# Patient Record
Sex: FEMALE | Employment: OTHER | URBAN - METROPOLITAN AREA
[De-identification: names, ages, dates, MRNs, and addresses within clinical notes are randomized per-mention and may not be internally consistent; named-entity substitution may affect disease eponyms.]

---

## 2022-06-03 PROBLEM — R01.1 CARDIAC MURMUR: Status: ACTIVE | Noted: 2022-06-03

## 2022-06-03 PROBLEM — I48.0 PAROXYSMAL ATRIAL FIBRILLATION (HCC): Status: ACTIVE | Noted: 2022-06-03

## 2022-06-03 PROBLEM — J44.9 CHRONIC OBSTRUCTIVE PULMONARY DISEASE (HCC): Status: ACTIVE | Noted: 2022-06-03

## 2022-06-03 PROBLEM — I27.20 PULMONARY HYPERTENSION (HCC): Status: ACTIVE | Noted: 2022-06-03

## 2022-06-03 PROBLEM — E78.5 DYSLIPIDEMIA: Status: ACTIVE | Noted: 2022-06-03

## 2022-06-08 RX ORDER — FUROSEMIDE 20 MG/1
20 TABLET ORAL DAILY
COMMUNITY

## 2022-06-08 RX ORDER — LEVOCETIRIZINE DIHYDROCHLORIDE 5 MG/1
5 TABLET, FILM COATED ORAL EVERY EVENING
COMMUNITY

## 2022-06-10 ENCOUNTER — OFFICE VISIT (OUTPATIENT)
Dept: CARDIOLOGY CLINIC | Facility: CLINIC | Age: 87
End: 2022-06-10
Payer: MEDICARE

## 2022-06-10 VITALS
DIASTOLIC BLOOD PRESSURE: 70 MMHG | SYSTOLIC BLOOD PRESSURE: 100 MMHG | HEART RATE: 65 BPM | WEIGHT: 121 LBS | BODY MASS INDEX: 23.75 KG/M2 | HEIGHT: 60 IN | OXYGEN SATURATION: 98 % | TEMPERATURE: 97 F

## 2022-06-10 DIAGNOSIS — I11.9 HYPERTENSIVE HEART DISEASE WITHOUT HEART FAILURE: ICD-10-CM

## 2022-06-10 DIAGNOSIS — I27.20 PULMONARY HYPERTENSION (HCC): ICD-10-CM

## 2022-06-10 DIAGNOSIS — J44.9 CHRONIC OBSTRUCTIVE PULMONARY DISEASE, UNSPECIFIED COPD TYPE (HCC): ICD-10-CM

## 2022-06-10 DIAGNOSIS — E78.5 DYSLIPIDEMIA: ICD-10-CM

## 2022-06-10 DIAGNOSIS — I48.0 PAROXYSMAL ATRIAL FIBRILLATION (HCC): ICD-10-CM

## 2022-06-10 DIAGNOSIS — R01.1 CARDIAC MURMUR: ICD-10-CM

## 2022-06-10 PROCEDURE — 99214 OFFICE O/P EST MOD 30 MIN: CPT | Performed by: INTERNAL MEDICINE

## 2022-06-10 RX ORDER — CHLORAL HYDRATE 500 MG
1 CAPSULE ORAL 2 TIMES DAILY
COMMUNITY

## 2022-06-10 RX ORDER — NICOTINE POLACRILEX 2 MG
GUM BUCCAL DAILY
COMMUNITY

## 2022-06-10 NOTE — PROGRESS NOTES
Consultation - Cardiology Office  [facility]  Shelley Spencer 82 y o  female MRN: [id number]  : 1935  Unit/Bed#:  Encounter: [id number]      Assessment:     1  Paroxysmal atrial fibrillation (HCC)    2  Hypertensive heart disease without heart failure    3  Dyslipidemia    4  Cardiac murmur    5  Pulmonary hypertension ([facility] )    6  Chronic obstructive pulmonary disease, unspecified COPD type ([facility] [address] )        Discussion summary and Plan:       1  Paroxysmal atrial fibrillation  She is maintaining sinus rhythm  As per previous cardiac there is 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  Not on any AV node blocking drug heart rate is around 65 beats per minute  She is not but symptomatic  Antithrombotic therapies with Eliquis     2  Hypertensive heart disease without heart failure  She has takes diuretics blood pressure has been acceptable  3  Dyslipidemia  She has been on fenofibrate  She is not tolerating very well  Cardiology note reviewed  She could not tolerate other statins  She is doing well on it  And considering her age I will continue same Rx       4  Cardiac murmur  She does have history of cardiac murmur echo Doppler in May 2021 shows mild-to-moderate MR mild TR EF is acceptable  5  History of COPD  Currently stable     6  Mild pulmonary hypertension     7  DJD walks with the help of cane     continue current Rx  Note reviewed  No changes made  Follow-up in 6 months and then will consider blood test       Patient / Fitz Rivas was advised and educated to call our office  immediately if  patient has any new symptoms of chest pain/shortness of breath, near-syncope, syncope, light headedness sustained palpitations  or any other cardiovascular symptoms before their scheduled follow-up appointment  Office #[phone]  Thank you for your consultation    If you have any question please call me at [phone]    Counseling :  A description of the counseling  Goals and Barriers  Patient's ability to self care: Yes  Medication side effect reviewed with patient in detail and all their questions answered to their satisfaction  Primary Care Physician Requesting Consult: No primary care provider on file  Reason for Consult / Principal Problem:   Paroxysmal atrial fibrillation and to establish with Cardiology practice  HPI :     Bam Gaspar is a 82y o  year old female who was referred by primary care doctor for  Atrial fibrillation and to establish with practice  Patient who used to live in [place] moved here as her  has passed away  She used to follow up with cardiologist who has note from February 22nd I reviewed  She has medical history significant as per note intermittent /paroxysmal atrial fibrillation, elevated Cyn Vasc score, mild pulmonary hypertension, bronchiectasis, DJD and dyslipidemia  She has an echo Doppler done in May 2021 which shows her EF is 55% mild left atrial enlargement with size of 4 4 cm mild-to-moderate MR and mild TR with PA pressure 35 mmHg  She has been doing well her med list was reviewed she is taking Eliquis 2 5 twice a day Lasix 20 mg daily fenofibrate and Xyzal       She moved here as her son works air and live in this area  She lives alone she is pretty independent in her daily activities  She has no new complaints of chest pain shortness of breath dizziness lightheadedness or palpitations  She walks with the help of cane  She had history of hysterectomy as well as shoulder surgery otherwise she is doing well  History of cholecystectomy      Review of Systems   Constitutional: Negative for activity change, chills, diaphoresis, fever and unexpected weight change  HENT: Negative for congestion  Eyes: Negative for discharge and redness  Respiratory: Negative for cough, chest tightness, shortness of breath and wheezing  Cardiovascular: Negative    Negative for chest pain, palpitations and leg swelling  Gastrointestinal: Negative for abdominal pain, diarrhea and nausea  Endocrine: Negative  Genitourinary: Negative for decreased urine volume and urgency  Musculoskeletal: Positive for arthralgias and gait problem  Negative for back pain  Skin: Negative for rash and wound  Allergic/Immunologic: Negative  Neurological: Negative for dizziness, seizures, syncope, weakness, light-headedness and headaches  Hematological: Negative  Psychiatric/Behavioral: Negative for agitation and confusion  The patient is not nervous/anxious  Historical Information   Past Medical History:   Diagnosis Date    A-fib [facility])     from prior records    COPD [facility]chronic obstructive pulmonary disease) [facility] [address] )     Hyperlipidemia     MR (mitral regurgitation)     Pul[facility] )      History reviewed  No pertinent surgical history    Social History     Substance and Sexual Activity   Alcohol Use Never     Social History     Substance and Sexual Activity   Drug Use Not on file     Social History     Tobacco Use   Smoking Status Never Smoker   Smokeless Tobacco Never Used     Family History:   Family History   Problem Relation Age of Onset    Stroke Sister        Meds/Allergies     Allergies   Allergen Reactions    Nitrofurantoin Other (See Comments)    Tramadol Other (See Comments)       Current Outpatient Medications:     apixaban (ELIQUIS) 2 5 mg, Take 2 5 mg by mouth 2 (two) times a day, Disp: , Rfl:     Biotin 1 MG CAPS, Use daily, Disp: , Rfl:     Calcium Carbonate Antacid (CALCIUM CARBONATE PO), Take by mouth in the morning, Disp: , Rfl:     Cholecalciferol (Vitamin D3) 1 25 MG (68190 UT) TABS, Take by mouth, Disp: , Rfl:     FENOFIBRATE PO, Take 160 mg by mouth in the morning, Disp: , Rfl:     furosemide (LASIX) 20 mg tablet, Take 20 mg by mouth in the morning, Disp: , Rfl:     GNP CRANBERRY EXTRACT PO, Take 600 mg by mouth, Disp: , Rfl:     levocetirizine (XYZAL) 5 MG tablet, Take 5 mg by mouth every evening, Disp: , Rfl:     Multiple Vitamin (MULTIVITAMIN ADULT PO), Take 1 tablet by mouth daily, Disp: , Rfl:     Omega-3 Fatty Acids (fish oil) 1,000 mg, Take 1 g by mouth 2 (two) times a day, Disp: , Rfl:     TIMOLOL MALEATE OP, Apply to eye daily, Disp: , Rfl:     Vitals: Blood pressure 100/70, pulse 65, temperature (!) 97 °F (36 1 °C), height 5' (1 524 m), weight 54 9 kg (121 lb), SpO2 98 %  ?  Body mass index is 23 63 kg/m²  Wt Readings from Last 3 Encounters:   06/10/22 54 9 kg (121 lb)     Vitals:    06/10/22 1356   Weight: 54 9 kg (121 lb)     BP Readings from Last 3 Encounters:   06/10/22 100/70         Physical Exam    Neurologic:  Alert & oriented x 3, no new focal deficits, Not in any acute distress,  Constitutional:  Adequate built, non-toxic appearance   Neck: Normal range of motion, no tenderness,  Neck supple   Respiratory:  Bilateral air entry, mostly clear to auscultation  Cardiovascular: S1-S2 regular with a 2/6 ejection systolic murmur and S4 is present  GI:  Soft, nondistended,  nontender, no hepatosplenomegaly appreciated  Musculoskeletal:  No edema, no tenderness, no deformities  Skin:  Well hydrated, no rash   Extremities:  No edema and distal pulses are present  Psychiatric:  Speech and behavior appropriate     Diagnostic Studies Review Cardio:      Echo Doppler done in May 2021 in  [place] shows EF 55%, mild-to-moderate MR, mild TR with PA pressure 34 left atrial size was 4 4 cm  EKG:  Twelve lead EKG 06/10/2022 shows normal sinus rhythm heart rate 65 beats per minute no other abnormality  Dr Maureen Pace MD [facility]      "This note has been constructed using a voice recognition system  Therefore there may be syntax, spelling, and/or grammatical errors   Please call if you have any questions  "

## 2022-06-28 DIAGNOSIS — I48.0 PAROXYSMAL ATRIAL FIBRILLATION (HCC): Primary | ICD-10-CM

## 2022-10-05 ENCOUNTER — TELEPHONE (OUTPATIENT)
Dept: CARDIOLOGY CLINIC | Facility: CLINIC | Age: 87
End: 2022-10-05

## 2022-10-05 NOTE — TELEPHONE ENCOUNTER
If she is doing well she can continue to monitor at home however she has recurrent episodes of chest in particularly chest pain accompanied with shortness of breath or chest heaviness and he need to call us or go to the hospital

## 2022-12-16 ENCOUNTER — OFFICE VISIT (OUTPATIENT)
Dept: CARDIOLOGY CLINIC | Facility: CLINIC | Age: 87
End: 2022-12-16

## 2022-12-16 VITALS
HEIGHT: 60 IN | OXYGEN SATURATION: 99 % | DIASTOLIC BLOOD PRESSURE: 80 MMHG | TEMPERATURE: 97 F | HEART RATE: 75 BPM | SYSTOLIC BLOOD PRESSURE: 120 MMHG | WEIGHT: 120 LBS | BODY MASS INDEX: 23.56 KG/M2

## 2022-12-16 DIAGNOSIS — R01.1 CARDIAC MURMUR: ICD-10-CM

## 2022-12-16 DIAGNOSIS — J44.9 CHRONIC OBSTRUCTIVE PULMONARY DISEASE, UNSPECIFIED COPD TYPE (HCC): ICD-10-CM

## 2022-12-16 DIAGNOSIS — I11.9 HYPERTENSIVE HEART DISEASE WITHOUT HEART FAILURE: ICD-10-CM

## 2022-12-16 DIAGNOSIS — I48.0 PAROXYSMAL ATRIAL FIBRILLATION (HCC): ICD-10-CM

## 2022-12-16 DIAGNOSIS — I27.20 PULMONARY HYPERTENSION (HCC): ICD-10-CM

## 2022-12-16 DIAGNOSIS — E78.5 DYSLIPIDEMIA: ICD-10-CM

## 2022-12-16 RX ORDER — FENOFIBRATE 160 MG/1
TABLET ORAL
COMMUNITY
Start: 2022-11-09

## 2022-12-16 RX ORDER — GLIMEPIRIDE 2 MG/1
TABLET ORAL 2 TIMES DAILY
COMMUNITY

## 2022-12-16 NOTE — PROGRESS NOTES
Progress note- Cardiology Office   [facility]  Dominick Iyer 82 y o  female MRN: [id number]  : 1935  Unit/Bed#:  Encounter: [id number]      Assessment:     1  Hypertensive heart disease without heart failure    2  Paroxysmal atrial fibrillation (HCC)    3  Dyslipidemia    4  Cardiac murmur    5  Pulmonary hypertension ([address] )    6  Chronic obstructive pulmonary disease, unspecified COPD type ([facility] [address] )        Discussion summary and Plan:       1  Paroxysmal atrial fibrillation  She is maintaining sinus rhythm  As per previous cardiac there is 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  Not on any AV node blocking drug heart rate is around 65 beats per minute  She is not but symptomatic  Antithrombotic therapies with Eliquis  Heart rate today 75 bpm   If heart rate goes up we may consider low-dose beta-blocker  2  Hypertensive heart disease without heart failure  She has takes diuretics blood pressure has been acceptable  Same medication     3  Dyslipidemia  She has been on fenofibrate  She is not tolerating very well  Cardiology note reviewed  She could not tolerate other statins  She is doing well on it  And considering her age I will continue same Rx       4  Cardiac murmur  She does have history of cardiac murmur echo Doppler in May 2021 shows mild-to-moderate MR mild TR EF is acceptable  Good Doppler reviewed      5  History of COPD and bronchiectasis  Currently stable patient never smoked her  used to smoke and she worked in [employer]  6  Mild pulmonary hypertension echo Doppler reviewed     7  DJD walks with the help of cane    Continue current Rx  She is suggested to have 1 scheduled blood test she will schedule to follow-up with her primary care doctor        Patient  was advised and educated to call our office  immediately if  patient has any new symptoms of chest pain/shortness of breath, near-syncope, syncope, light headedness sustained palpitations  or any other cardiovascular symptoms before their scheduled follow-up appointment  Office #[phone]  Thank you for your consultation  If you have any question please call me at [phone]    Counseling :  A description of the counseling  Goals and Barriers  Patient's ability to self care: Yes  Medication side effect reviewed with patient in detail and all their questions answered to their satisfaction  Primary Care Physician  GONZALEZ Newton      HPI :     Edna Jansen is a 82y o  year old female who was referred by primary care doctor for  Atrial fibrillation and to establish with practice  Patient who used to live in [place] moved here as her  has passed away  She used to follow up with cardiologist who has note from February 22nd I reviewed  She has medical history significant as per note intermittent /paroxysmal atrial fibrillation, elevated Cyn Vasc score, mild pulmonary hypertension, bronchiectasis, DJD and dyslipidemia  She has an echo Doppler done in May 2021 which shows her EF is 55% mild left atrial enlargement with size of 4 4 cm mild-to-moderate MR and mild TR with PA pressure 35 mmHg  She has been doing well her med list was reviewed she is taking Eliquis 2 5 twice a day Lasix 20 mg daily fenofibrate and Xyzal       She had history of hysterectomy as well as shoulder surgery otherwise she is doing well  History of cholecystectomy    12/16/2022  Patient who has a medical history significant for paroxysmal atrial fibrillation, high [id number] score, mild pulm hypertension, history of bronchiectasis, DJD who came for follow-up  Her previous EF is around 55%  She moved here as her son works here  She walks with the help of a cane but she is pretty independent in her daily activity  Heart positive closed history of hysterectomy  She is otherwise doing well    No nausea no vomiting no fever no chills no PND no orthopnea no other cardiovascular issues  Review of Systems   Constitutional: Negative for activity change, chills, diaphoresis, fever and unexpected weight change  HENT: Negative for congestion  Eyes: Negative for discharge and redness  Respiratory: Negative for cough, chest tightness, shortness of breath and wheezing  Cardiovascular: Negative  Negative for chest pain, palpitations and leg swelling  Gastrointestinal: Negative for abdominal pain, diarrhea and nausea  Endocrine: Negative  Genitourinary: Negative for decreased urine volume and urgency  Musculoskeletal: Positive for arthralgias and gait problem  Negative for back pain  Skin: Negative for rash and wound  Allergic/Immunologic: Negative  Neurological: Negative for dizziness, seizures, syncope, weakness, light-headedness and headaches  Hematological: Negative  Psychiatric/Behavioral: Negative for agitation and confusion  The patient is not nervous/anxious  Historical Information   Past Medical History:   Diagnosis Date   • A-fib ([place] [address] )     from prior records   • COPD (chronic obstructive pulmonary disease) ([place] [address] )    • Hyperlipidemia    • MR (mitral regurgitation)    • Pulmonary hypertension ([place] [address] )      History reviewed  No pertinent surgical history    Social History     Substance and Sexual Activity   Alcohol Use Never     Social History     Substance and Sexual Activity   Drug Use Not on file     Social History     Tobacco Use   Smoking Status Never   Smokeless Tobacco Never     Family History:   Family History   Problem Relation Age of Onset   • Stroke Sister        Meds/Allergies     Allergies   Allergen Reactions   • Nitrofurantoin Other (See Comments)   • Tramadol Other (See Comments)       Current Outpatient Medications:   •  apixaban (ELIQUIS) 2 5 mg, Take 1 tablet (2 5 mg total) by mouth 2 (two) times a day, Disp: 180 tablet, Rfl: 3  •  Biotin 1 MG CAPS, Use daily, Disp: , Rfl:   •  Calcium Carbonate Antacid (CALCIUM CARBONATE PO), Take by mouth in the morning, Disp: , Rfl:   •  Cholecalciferol (Vitamin D3) 1 25 MG (72685 UT) TABS, Take by mouth, Disp: , Rfl:   •  fenofibrate 160 MG tablet, , Disp: , Rfl:   •  furosemide (LASIX) 20 mg tablet, Take 20 mg by mouth in the morning, Disp: , Rfl:   •  GNP CRANBERRY EXTRACT PO, Take 600 mg by mouth, Disp: , Rfl:   •  levocetirizine (XYZAL) 5 MG tablet, Take 5 mg by mouth every evening, Disp: , Rfl:   •  Multiple Vitamin (MULTIVITAMIN ADULT PO), Take 1 tablet by mouth daily, Disp: , Rfl:   •  timolol (TIMOPTIC) 0 25 % ophthalmic solution, 2 (two) times a day, Disp: , Rfl:     Vitals: Blood pressure 120/80, pulse 75, temperature (!) 97 °F (36 1 °C), height 5' (1 524 m), weight 54 4 kg (120 lb), SpO2 99 %  ?  Body mass index is 23 44 kg/m²  Wt Readings from Last 3 Encounters:   12/16/22 54 4 kg (120 lb)   06/10/22 54 9 kg (121 lb)     Vitals:    12/16/22 1255   Weight: 54 4 kg (120 lb)     BP Readings from Last 3 Encounters:   12/16/22 120/80   06/10/22 100/70         Physical Exam  Constitutional:       General: She is not in acute distress  Appearance: She is well-developed  She is not diaphoretic  Neck:      Thyroid: No thyromegaly  Vascular: No JVD  Trachea: No tracheal deviation  Cardiovascular:      Rate and Rhythm: Normal rate and regular rhythm  Heart sounds: S1 normal and S2 normal  Heart sounds not distant  Murmur heard  Systolic (ejection) murmur is present with a grade of 2/6  No friction rub  No gallop  No S3 or S4 sounds  Pulmonary:      Effort: Pulmonary effort is normal  No respiratory distress  Breath sounds: No wheezing or rales  Comments: Bilateral air entry with coarse breath sounds and rare crackles  Chest:      Chest wall: No tenderness  Abdominal:      General: Bowel sounds are normal  There is no distension  Palpations: Abdomen is soft  Tenderness: There is no abdominal tenderness     Musculoskeletal:         General: No deformity  Cervical back: Neck supple  Comments: No edema   Skin:     General: Skin is warm and dry  Coloration: Skin is not pale  Findings: No rash  Neurological:      Mental Status: She is alert and oriented to person, place, and time  Psychiatric:         Behavior: Behavior normal          Judgment: Judgment normal              Diagnostic Studies Review Cardio:      Echo Doppler done in May 2021 in  [place] shows EF 55%, mild-to-moderate MR, mild TR with PA pressure 34 left atrial size was 4 4 cm  EKG:  Twelve lead EKG 06/10/2022 shows normal sinus rhythm heart rate 65 beats per minute no other abnormality  Twelve-lead EKG done on 12/16/2022 showed normal sinus and heart rate 75 bpm   No change from other EKG        Dr Kayleen Rees MD [facility]      "This note has been constructed using a voice recognition system  Therefore there may be syntax, spelling, and/or grammatical errors   Please call if you have any questions  "

## 2023-01-11 NOTE — PROGRESS NOTES
Assessment and Plan:   Ms Dilma Miller is an 29-year-old female with history significant for left foot osteoarthritis who presents to establish with [facility]  She is transferring care from rheumatology in [facility]  She is self-referred today  Byron Falcon presents today for continued management of left foot osteoarthritis for which she is already established with podiatry  She does not offer any additional complaints today  I advised her for a diagnosis of primary osteoarthritis affecting a limited joint region she does not require an ongoing rheumatology follow-up and she may continue care through podiatry  She can continue the Tylenol and over-the-counter pain rubs as needed  I advised her to check with her cardiologist if she may also utilize topical diclofenac gel as needed and to discuss with her podiatrist if she may benefit from an intra-articular cortisone injection  - I will obtain an x-ray of her left foot to ensure there are no concerns for an inflammatory arthropathy  Plan:  Diagnoses and all orders for this visit:    Primary osteoarthritis of left foot  -     XR foot 3+ vw left; Future    Other orders  -     Calcium Polycarbophil (Fiber) 625 MG TABS; Take 625 mg by mouth daily      Activities as tolerated  Continue other medications as prescribed by PCP and other specialists  RTC PRN  HPI  Ms Dilma Miller is an 29-year-old female with history significant for left foot osteoarthritis who presents to establish with [facility]  She is transferring care from rheumatology in [facility]  She is self-referred today  Patient reports for the past few years she has experienced fairly constant pain on the dorsum of her left foot in the metatarsal region  She does have a history of prior fracture of her left foot  She reports at rest she is not symptomatic but as soon as she weightbears the pain is constant    She takes Tylenol as needed and utilizes an over-the-counter pain rub which provides her with some relief  She avoids NSAIDs as she is on chronic anticoagulation for atrial fibrillation  She has tried over-the-counter topical CBD ointment without benefit  She also follows with a podiatrist in [place] but no specific treatment has been offered  She has not received intra-articular cortisone injections  She had also been following with a rheumatologist in [facility] for the past 2 to 3 years and reports that she would be seen for routine follow-ups but she was not being prescribed any treatment and no new recommendations were being made  She is not offering any other complaints today and reports she is only establishing for the left foot osteoarthritis  The following portions of the patient's history were reviewed and updated as appropriate: allergies, current medications, past family history, past medical history, past social history, past surgical history and problem list       Review of Systems  Constitutional: Negative for weight change, fevers, chills, night sweats, fatigue  ENT/Mouth: Negative for hearing changes, ear pain, nasal congestion, sinus pain, hoarseness, sore throat, rhinorrhea, swallowing difficulty  Eyes: Negative for pain, redness, discharge, vision changes  Cardiovascular: Negative for chest pain, SOB, palpitations  Respiratory: Negative for cough, sputum, wheezing, dyspnea  Gastrointestinal: Negative for nausea, vomiting, diarrhea, constipation, pain, heartburn  Genitourinary: Negative for dysuria, urinary frequency, hematuria  Musculoskeletal: As per HPI  Skin: Negative for skin rash, color changes  Neuro: Negative for weakness, numbness, tingling, loss of consciousness  Psych: Negative for anxiety, depression  Heme/Lymph: Negative for easy bruising, bleeding, lymphadenopathy          Past Medical History:   Diagnosis Date   • [facility] )     from prior records   • COPD (chronic obstructive pulmonary disease) ([facility] )    • Hyperlipidemia    • MR (mitral regurgitation)    • Pulmonary hypertension ([facility] )        History reviewed  No pertinent surgical history        Social History     Socioeconomic History   • Marital status: Unknown     Spouse name: Not on file   • Number of children: Not on file   • Years of education: Not on file   • Highest education level: Not on file   Occupational History   • Not on file   Tobacco Use   • Smoking status: Never   • Smokeless tobacco: Never   Substance and Sexual Activity   • Alcohol use: Never   • Drug use: Not on file   • Sexual activity: Not on file   Other Topics Concern   • Not on file   Social History Narrative   • Not on file     Social Determinants of Health     Financial Resource Strain: Not on file   Food Insecurity: Not on file   Transportation Needs: Not on file   Physical Activity: Not on file   Stress: Not on file   Social Connections: Not on file   Intimate Partner Violence: Not on file   Housing Stability: Not on file       Family History   Problem Relation Age of Onset   • Stroke Sister        Allergies   Allergen Reactions   • Nitrofurantoin Other (See Comments)   • Tramadol Other (See Comments)       Current Outpatient Medications:   •  apixaban (ELIQUIS) 2 5 mg, Take 1 tablet (2 5 mg total) by mouth 2 (two) times a day, Disp: 180 tablet, Rfl: 3  •  Biotin 1 MG CAPS, Use daily, Disp: , Rfl:   •  Calcium Carbonate Antacid (CALCIUM CARBONATE PO), Take by mouth in the morning, Disp: , Rfl:   •  Calcium Polycarbophil (Fiber) 625 MG TABS, Take 625 mg by mouth daily, Disp: , Rfl:   •  Cholecalciferol (Vitamin D3) 1 25 MG (51315 UT) TABS, Take by mouth, Disp: , Rfl:   •  fenofibrate 160 MG tablet, , Disp: , Rfl:   •  furosemide (LASIX) 20 mg tablet, Take 20 mg by mouth in the morning, Disp: , Rfl:   •  GNP CRANBERRY EXTRACT PO, Take 600 mg by mouth, Disp: , Rfl:   •  levocetirizine (XYZAL) 5 MG tablet, Take 5 mg by mouth every evening, Disp: , Rfl:   •  Multiple Vitamin (MULTIVITAMIN ADULT PO), Take 1 tablet by mouth daily, Disp: , Rfl:   •  timolol (TIMOPTIC) 0 25 % ophthalmic solution, 2 (two) times a day, Disp: , Rfl:       Objective:    Vitals:    01/12/23 1103   BP: 143/87   Pulse: 71   Temp: 98 4 °F (36 9 °C)   Weight: 54 6 kg (120 lb 4 8 oz)       Physical Exam  General: Well appearing, well nourished, in no distress  Oriented x 3, normal mood and affect  Ambulating with aid of a cane  Skin: Good turgor, no rash, unusual bruising or prominent lesions  Hair: Normal texture and distribution  Nails: Normal color, no deformities  HEENT:  Head: Normocephalic, atraumatic  Eyes: Conjunctiva clear, sclera non-icteric, EOM intact  Extremities: No amputations or deformities, cyanosis, edema  Musculoskeletal:   Left foot - no tenderness or soft tissue swelling noted but she does have a slight deformity noted on the dorsum of her foot in the metatarsal region  Neurologic: Alert and oriented  No focal neurological deficits appreciated  Psychiatric: Normal mood and affect  BERNARDO Barnett    Rheumatology

## 2023-01-12 ENCOUNTER — OFFICE VISIT (OUTPATIENT)
Dept: RHEUMATOLOGY | Facility: CLINIC | Age: 88
End: 2023-01-12

## 2023-01-12 VITALS
BODY MASS INDEX: 23.49 KG/M2 | HEART RATE: 71 BPM | SYSTOLIC BLOOD PRESSURE: 143 MMHG | WEIGHT: 120.3 LBS | DIASTOLIC BLOOD PRESSURE: 87 MMHG | TEMPERATURE: 98.4 F

## 2023-01-12 DIAGNOSIS — M19.072 PRIMARY OSTEOARTHRITIS OF LEFT FOOT: Primary | ICD-10-CM

## 2023-01-12 RX ORDER — CALCIUM POLYCARBOPHIL 625 MG
625 TABLET ORAL DAILY
COMMUNITY

## 2023-01-12 NOTE — PATIENT INSTRUCTIONS
1) Voltaren gel - ask your cardiologist if okay to use  2) Podiatry about shot in foot  3) CBD over the counter

## 2023-01-18 ENCOUNTER — TELEPHONE (OUTPATIENT)
Dept: UROLOGY | Facility: MEDICAL CENTER | Age: 88
End: 2023-01-18

## 2023-01-18 NOTE — TELEPHONE ENCOUNTER
Called and spoke with patient reports having trouble urinating today  Reports started today with only dribbling, no discomfort but stating feeling like her belly is getting hard  Reports had good fluid intake last night  No fever/chills, nausea/vomiting  Pt reports this happened before maybe about 5 years ago and she went to ED for straight cath  Advised patient she can reach out to PCP to have urine testing to ordered  Appt scheduled for 2/10/23 due to location  Also advised patient of ED precautions  Pt states she will reach out to PCP to she if she can have testing done

## 2023-01-18 NOTE — TELEPHONE ENCOUNTER
Please Triage  New Patient    What is the reason for the patient’s appointment? Pt called to make an appt for difficulty urinating  Pt had same problem 20 years ago  What office location does the patient prefer? [facility]     Imaging/Lab Results:in epic     Do we accept the patient's insurance or is the patient Self-Pay? Insurance Provider: medicare   Plan Type/Number:  Member ID#: Has the patient had any previous Urologist(s)? Yea     Have patient records been requested? If not are records showing in Epic:   In epic   Has the patient had any outside testing done? Does the patient have a personal history of cancer?   No     Pt can be reached at [phone]

## 2023-02-03 NOTE — PROGRESS NOTES
Office Visit- Urology  Edwin Krishnan 1935 MRN: [id number]      Assessment/Discussion/Plan    82 y o  female managed by NEW PATIENT    1  Weak urinary stream    -Etiology unclear at this point: No neurologic history, only 1 prior episode of acute urinary retention that was due to pharmacotherapy  Patient is not constipated -advised patient to follow-up with PCP regarding stool symptoms described below  Occasional review did not reveal any possible contributing factors  I did not appreciate a pelvic organ prolapse on physical exam today  -PVR today was 49 mL  Patient urinated prior to coming into the office and was unable to provide a urine sample today     -Patient to return for nurse visit for a repeat PVR  She was instructed to return with a full bladder  A urine sample be obtained at that time and if clinically indicated may obtain a culture and treat as medically indicated  -At this point for further data and clarification of patient's symptomatology we will try to obtain urodynamics  Patient does have doctor appointments and PA so insurance should be able to cover  Follow-up with Dr Diane Grimes with results of urodynamics    2  UTI    -See HPI below  -Unclear if patient's recent urine culture is indicative of true urinary infection as patient did not have typical symptoms of such  -Plan to get a UA at follow-up nurse visit in 2 weeks  -Patient taking cranberry supplementation but not known if it contains PAC    Advised patient to utilize cranberry supplementation with 36 mg of PAC as well as to stay well-hydrated throughout the day to prevent recurrence of urinary tract infections  -If patient continues urinary symptoms with positive urine cultures, will plan to initiate topical estrogen cream    -urine culture ordered and patient instructed to obtain if she has symptoms indicative of a UTI      Chief Complaint:   Ester Lopez is a 82 y o  female presenting to the office for initial evaluation of UTI and weak urinary stream        Subjective    Rafael Reece is a 82year old female with a PMH significant for COPD, Pulmonary HTN, Atrial Fibrillation     Per documentation, patient has been experiencing urinary urgency associated with urinary frequency and mild dysuria since 1/16  Per documentation on 1/18, patient reached out to office to state that she had difficulty with urination  Her culture results from 1/18 revealed multidrug-resistant E  Coli  The species was resistant to Augmentin, ampicillin, cefazolin, Cipro, levofloxacin, piperacillin/tazobactum, tetracycline and Bactrim  Isolate was susceptible to nitrofurantoin but patient has allergy listed  Intermediate efficacy of cefuroxime  PCP placed patient on 7-day course of twice daily Keflex on 1/18  Patient was having persistent symptoms and a repeat culture was obtained on 1/26 which this time grew Pseudomonas (no growth of the previously cultured E  Coli) which was susceptible to all antibiotics  She was started on a course of 7 twice daily Cipro  Patient presents to the office today to establish care  She has seen a previous urologist for what sounds like overactive bladder  She reports that she was was on medication and then got increased and resulted in acute urinary retention at which time was hospitalized and drained 5 L of urine with [place] catheter placement  She has not been on medications for urinary symptoms since  She states that she does not have the urgency that she previously did she followed with her previous urologist  See above history regarding recent UTI  States that she typically has 1 UTI a year  She was not having typical symptoms of urinary tract infection including fever, chills, flank pain, dysuria, increased frequency, urgency  She stated she had pain in her back when lying in bed which prompted hert to PCP to obtain a urine culture  At time of visit today she is asymptomatic for urinary tract infection    Her most bothersome urinary symptom is that her stream has been very weak and most of the time just dribbles out  This has been present for the past month there is some sensation of incomplete bladder emptying  Otherwise patient denies bothersome urinary symptoms  She notes that she does have some incontinence if she waits to urinate too long  No component of stress incontinence or urge incontinence otherwise  She urinates with a frequency of about every 2 hours and does not find this bothersome  No history of urologic surgery or nephrolithiasis  No vaginal symptoms  She denies vaginal discharge, sensation of vaginal bulge or pressure, burning of the vagina with urination  No pelvic pressure or pain  she has extensive gynecologic surgery  She has had a hysterectomy but she does not remember the reason why  Her last visit with gynecology was a year and a half ago  At that time she had a pelvic exam which did not reveal any signs of prolapse   Or other abnormality  She notes that she has regular bowel movements every day but lately she has been noticing that she has a sensation of a mass or ball at her anus  When she goes to sit down there is stool at the anus the patient is able to express out  There is no pain, itchiness associated with this  She never has to strain or push to have a bowel movement  There is no component of fecal incontinence or diarrhea  ROS:   Review of Systems   Constitutional: Negative  Negative for chills and fever  HENT: Negative  Eyes: Negative  Respiratory: Negative for shortness of breath  Cardiovascular: Negative for chest pain  Gastrointestinal: Negative for abdominal distention, abdominal pain, anal bleeding, constipation, rectal pain and vomiting  Endocrine: Negative  Genitourinary: Positive for difficulty urinating   Negative for decreased urine volume, dyspareunia, dysuria, flank pain, frequency, hematuria, pelvic pain, urgency, vaginal bleeding, vaginal discharge and vaginal pain  Skin: Negative for rash  Allergic/Immunologic: Negative  Neurological: Negative  Psychiatric/Behavioral: Negative  Past Medical History  Past Medical History:   Diagnosis Date   • A-fib ([facility] )     from prior records   • COPD (chronic obstructive pulmonary disease) ([facility] )    • Hyperlipidemia    • MR (mitral regurgitation)    • Pulmonary hypertension (HCC)        Past Surgical History  History reviewed  No pertinent surgical history      Past Family History  Family History   Problem Relation Age of Onset   • Stroke Sister        Past Social history  Social History     Socioeconomic History   • Marital status: Unknown     Spouse name: Not on file   • Number of children: Not on file   • Years of education: Not on file   • Highest education level: Not on file   Occupational History   • Not on file   Tobacco Use   • Smoking status: Never   • Smokeless tobacco: Never   Substance and Sexual Activity   • Alcohol use: Never   • Drug use: Not on file   • Sexual activity: Not on file   Other Topics Concern   • Not on file   Social History Narrative   • Not on file     Social Determinants of Health     Financial Resource Strain: Not on file   Food Insecurity: Not on file   Transportation Needs: Not on file   Physical Activity: Not on file   Stress: Not on file   Social Connections: Not on file   Intimate Partner Violence: Not on file   Housing Stability: Not on file       Current Medications  Current Outpatient Medications   Medication Sig Dispense Refill   • apixaban (ELIQUIS) 2 5 mg Take 1 tablet (2 5 mg total) by mouth 2 (two) times a day 180 tablet 3   • Biotin 1 MG CAPS Use daily     • Calcium Carbonate Antacid (CALCIUM CARBONATE PO) Take by mouth in the morning     • Calcium Polycarbophil (Fiber) 625 MG TABS Take 625 mg by mouth daily     • Cholecalciferol (Vitamin D3) 1 25 MG (92446 UT) TABS Take by mouth     • fenofibrate 160 MG tablet      • furosemide (LASIX) 20 mg tablet Take 20 mg by mouth in the morning     • GNP CRANBERRY EXTRACT PO Take 600 mg by mouth     • levocetirizine (XYZAL) 5 MG tablet Take 5 mg by mouth every evening     • Multiple Vitamin (MULTIVITAMIN ADULT PO) Take 1 tablet by mouth daily     • timolol (TIMOPTIC) 0 25 % ophthalmic solution 2 (two) times a day       No current facility-administered medications for this visit  Allergies  Allergies   Allergen Reactions   • Nitrofurantoin Other (See Comments)   • Tramadol Other (See Comments)       OBJECTIVE    Vitals   Vitals:    02/10/23 0952   Pulse: 99   Resp: 16   SpO2: 98%   Weight: 56 kg (123 lb 6 4 oz)   Height: 5' (1 524 m)       PVR: 49 ml    Physical Exam  Vitals reviewed  Exam conducted with a chaperone present  Constitutional:       General: She is not in acute distress  Appearance: Normal appearance  She is normal weight  She is not ill-appearing or toxic-appearing  HENT:      Right Ear: External ear normal       Left Ear: External ear normal    Cardiovascular:      Rate and Rhythm: Normal rate  Pulmonary:      Effort: Pulmonary effort is normal  No respiratory distress  Abdominal:      General: Abdomen is flat  Palpations: Abdomen is soft  Genitourinary:     Vagina: No vaginal discharge  Comments: Atrophy of external genitalia  Visualization with speculum exam limited due to lack of light source as well as patient discomfort during exam  No abnormality upon visualization of the urethra  No visual or palpable appreciation of pelvic organ prolapse with and without Valsalva    No evidence of hemorrhoids   Musculoskeletal:         General: Normal range of motion  Cervical back: Normal range of motion and neck supple  Skin:     General: Skin is warm and dry  Neurological:      General: No focal deficit present  Mental Status: She is alert  Cranial Nerves: No cranial nerve deficit     Psychiatric:         Mood and Affect: Mood normal          Behavior: Behavior normal          Thought Content: Thought content normal           Labs:     No results found for: PSA  No results found for: CREATININE   No results found for: HGBA1C  No results found for: GLUCOSE, CALCIUM, NA, K, CO2, CL, BUN, CREATININE    I have personally reviewed all pertinent lab results and reviewed with patient      Chapis Mcdaniel PA-C  Date: 2/10/2023 Time: 11:34 AM  [facility]    This note was written using fluency dictation software  Please excuse any resulting minor grammatical errors

## 2023-02-10 ENCOUNTER — TELEPHONE (OUTPATIENT)
Dept: UROLOGY | Facility: CLINIC | Age: 88
End: 2023-02-10

## 2023-02-10 ENCOUNTER — OFFICE VISIT (OUTPATIENT)
Dept: UROLOGY | Facility: CLINIC | Age: 88
End: 2023-02-10

## 2023-02-10 VITALS
WEIGHT: 123.4 LBS | RESPIRATION RATE: 16 BRPM | BODY MASS INDEX: 24.23 KG/M2 | HEIGHT: 60 IN | OXYGEN SATURATION: 98 % | HEART RATE: 99 BPM

## 2023-02-10 DIAGNOSIS — R33.9 URINE RETENTION: ICD-10-CM

## 2023-02-10 DIAGNOSIS — N39.0 RECURRENT UTI: ICD-10-CM

## 2023-02-10 DIAGNOSIS — R39.12 WEAK URINARY STREAM: Primary | ICD-10-CM

## 2023-02-10 LAB — POST-VOID RESIDUAL VOLUME, ML POC: 49 ML

## 2023-02-10 NOTE — TELEPHONE ENCOUNTER
Please help assist patient in scheduling for urodynamics than a follow up with [facility] after

## 2023-02-14 ENCOUNTER — APPOINTMENT (OUTPATIENT)
Dept: LAB | Facility: CLINIC | Age: 88
End: 2023-02-14

## 2023-02-14 DIAGNOSIS — N39.0 RECURRENT UTI: ICD-10-CM

## 2023-02-16 LAB
BACTERIA UR CULT: ABNORMAL
BACTERIA UR CULT: ABNORMAL

## 2023-02-22 ENCOUNTER — TELEPHONE (OUTPATIENT)
Dept: CARDIOLOGY CLINIC | Facility: CLINIC | Age: 88
End: 2023-02-22

## 2023-02-22 ENCOUNTER — PROCEDURE VISIT (OUTPATIENT)
Dept: UROLOGY | Facility: CLINIC | Age: 88
End: 2023-02-22

## 2023-02-22 VITALS
WEIGHT: 121.2 LBS | RESPIRATION RATE: 18 BRPM | OXYGEN SATURATION: 98 % | HEIGHT: 60 IN | HEART RATE: 70 BPM | BODY MASS INDEX: 23.8 KG/M2

## 2023-02-22 DIAGNOSIS — R39.12 WEAK URINARY STREAM: Primary | ICD-10-CM

## 2023-02-22 NOTE — PROGRESS NOTES
2/22/2023    Yasmeen Batista  1935  [id number]    Diagnosis  Chief Complaint    pvr         Patient presents for PVR managed by Sheila Lo  Patient tolerated PVR well  Procedure   Patient returned this morning Patient states able to void  Patient voided 0 ml while in office  Bladder ultrasound performed and PVR measured 131 ml  No results found for this or any previous visit (from the past 4 hour(s))          Vitals:    02/22/23 0925   Pulse: 70   Resp: 18   SpO2: 98%   Weight: 55 kg (121 lb 3 2 oz)   Height: 5' (1 524 m)           [employer]

## 2023-03-07 ENCOUNTER — TELEPHONE (OUTPATIENT)
Dept: OTHER | Facility: OTHER | Age: 88
End: 2023-03-07

## 2023-03-07 NOTE — TELEPHONE ENCOUNTER
Called patient to review PVRs from last visit and reasoning for obtaining an additional PVR due to patient complaint of obstructive symptoms  Also reviewed follow-up with patient and rationale for doing so  Patient to follow-up with Dr Scarlet Goodrich for reevaluation of obstructive urinary symptoms/recurrent urinary tract infection   patient expresses understanding and all questions answered to satisfaction

## 2023-03-07 NOTE — TELEPHONE ENCOUNTER
Patient calling to speak with [facility] Evie Loya regarding her last two visits and her next upcoming, 05/22/2023  She expressed that she was not happy she was not informed to come to her visits with a full or empty bladder and is not sure the reasoning for her follow up   Please follow up

## 2023-03-09 ENCOUNTER — TELEPHONE (OUTPATIENT)
Dept: CARDIOLOGY CLINIC | Facility: CLINIC | Age: 88
End: 2023-03-09

## 2023-03-10 ENCOUNTER — NURSE TRIAGE (OUTPATIENT)
Dept: OTHER | Facility: OTHER | Age: 88
End: 2023-03-10

## 2023-03-10 ENCOUNTER — APPOINTMENT (OUTPATIENT)
Dept: LAB | Facility: CLINIC | Age: 88
End: 2023-03-10

## 2023-03-10 DIAGNOSIS — N39.0 URINARY TRACT INFECTION WITHOUT HEMATURIA, SITE UNSPECIFIED: ICD-10-CM

## 2023-03-10 DIAGNOSIS — N30.00 ACUTE CYSTITIS WITHOUT HEMATURIA: Primary | ICD-10-CM

## 2023-03-10 LAB
BACTERIA UR QL AUTO: ABNORMAL /HPF
BILIRUB UR QL STRIP: NEGATIVE
CLARITY UR: ABNORMAL
COLOR UR: ABNORMAL
GLUCOSE UR STRIP-MCNC: NEGATIVE MG/DL
HGB UR QL STRIP.AUTO: NEGATIVE
KETONES UR STRIP-MCNC: NEGATIVE MG/DL
LEUKOCYTE ESTERASE UR QL STRIP: ABNORMAL
NITRITE UR QL STRIP: NEGATIVE
NON-SQ EPI CELLS URNS QL MICRO: ABNORMAL /HPF
PH UR STRIP.AUTO: 6.5 [PH]
PROT UR STRIP-MCNC: NEGATIVE MG/DL
RBC #/AREA URNS AUTO: ABNORMAL /HPF
SP GR UR STRIP.AUTO: 1.01 (ref 1–1.03)
UROBILINOGEN UR STRIP-ACNC: <2 MG/DL
WBC #/AREA URNS AUTO: ABNORMAL /HPF

## 2023-03-10 RX ORDER — CEFUROXIME AXETIL 250 MG/1
250 TABLET ORAL EVERY 12 HOURS SCHEDULED
Qty: 14 TABLET | Refills: 0 | Status: SHIPPED | OUTPATIENT
Start: 2023-03-10 | End: 2023-03-13 | Stop reason: ALTCHOICE

## 2023-03-10 NOTE — TELEPHONE ENCOUNTER
Patient reports urinary frequency and mild to moderate pain with urination  She would like to go to the lab to rule out UTI  UA ordered  Reason for Disposition  • Urinating more frequently than usual (i e , frequency)    Answer Assessment - Initial Assessment Questions  1  SYMPTOM: "What's the main symptom you're concerned about?" (e g , frequency, incontinence)      Frequency, pain  2  ONSET: "When did they start?"      The last couple night   3  PAIN: "Is there any pain?" If Yes, ask: "How bad is it?" (Scale: 1-10; mild, moderate, severe)      Mild-moderate   4  CAUSE: "What do you think is causing the symptoms?"      Possible UTI  5  OTHER SYMPTOMS: "Do you have any other symptoms?" (e g , fever, flank pain, blood in urine, pain with urination)      Mild-moderate pain with urination  6   PREGNANCY: "Is there any chance you are pregnant?" "When was your last menstrual period?"      N/A    Protocols used: [facility]

## 2023-03-10 NOTE — TELEPHONE ENCOUNTER
Regarding: Possible UTI  ----- Message from [facility] sent at 3/10/2023  8:52 AM EST -----  "I believe I have an UTI   I have frequency and pain "

## 2023-03-10 NOTE — PROGRESS NOTES
Earlier today for low back pain with urinary frequency  She did give a urine sample today for UA and culture  UA demonstrated significant pyuria  I called patient to discuss given that it is almost the weekend and she may not be able to get antibiotic prescribed for the next 2 days  Option of waiting for culture to result versus empiric antibiotics  Patient like to initiate empiric antibiotics  If no growth of bacteria seen on urine culture by Monday we will call patient to stop antibiotics    If culture does result with growth of bacteria and susceptibilities show that Ceftin is not correct antibiotic will change at that time Ceftin sent to the pharmacy on file

## 2023-03-12 LAB — BACTERIA UR CULT: ABNORMAL

## 2023-03-13 RX ORDER — LEVOFLOXACIN 250 MG/1
250 TABLET ORAL EVERY 24 HOURS
Qty: 5 TABLET | Refills: 0 | Status: CANCELLED | OUTPATIENT
Start: 2023-03-13 | End: 2023-03-18

## 2023-03-13 RX ORDER — AMOXICILLIN AND CLAVULANATE POTASSIUM 500; 125 MG/1; MG/1
1 TABLET, FILM COATED ORAL EVERY 12 HOURS SCHEDULED
Qty: 10 TABLET | Refills: 0 | Status: SHIPPED | OUTPATIENT
Start: 2023-03-13 | End: 2023-03-18

## 2023-03-13 NOTE — TELEPHONE ENCOUNTER
Urine culture demonstrates growth of enterococcus faecalis  Unfortunately, the antibiotic that I sent over the weekend does not cover this specific species  Patient to stop taking Ceftin and start Augmentin 500 mg twice daily for next 5 days to adequately treat patient's urinary tract infection    Sent to [facility] in [place]

## 2023-03-21 ENCOUNTER — TELEPHONE (OUTPATIENT)
Dept: OTHER | Facility: OTHER | Age: 88
End: 2023-03-21

## 2023-03-21 NOTE — TELEPHONE ENCOUNTER
Pt completed Augmentin for UTI on 03/17/2023  She was asymptomatic when treated  She is still asymptomatic  She is wondering if she should have a f/u urine culture performed  Please call her after 2:00 pm today with care advice

## 2023-06-05 ENCOUNTER — APPOINTMENT (OUTPATIENT)
Dept: RADIOLOGY | Facility: CLINIC | Age: 88
End: 2023-06-05
Payer: MEDICARE

## 2023-06-05 DIAGNOSIS — R26.2 DIFFICULTY IN WALKING, NOT ELSEWHERE CLASSIFIED: ICD-10-CM

## 2023-06-05 DIAGNOSIS — M79.671 PAIN IN RIGHT FOOT: ICD-10-CM

## 2023-06-05 DIAGNOSIS — M79.672 PAIN IN LEFT FOOT: ICD-10-CM

## 2023-06-05 PROCEDURE — 73630 X-RAY EXAM OF FOOT: CPT

## 2023-07-10 DIAGNOSIS — I48.0 PAROXYSMAL ATRIAL FIBRILLATION (HCC): ICD-10-CM

## 2023-07-12 ENCOUNTER — OFFICE VISIT (OUTPATIENT)
Dept: CARDIOLOGY CLINIC | Facility: CLINIC | Age: 88
End: 2023-07-12
Payer: MEDICARE

## 2023-07-12 VITALS
BODY MASS INDEX: 23.36 KG/M2 | WEIGHT: 119 LBS | HEIGHT: 60 IN | HEART RATE: 88 BPM | OXYGEN SATURATION: 97 % | SYSTOLIC BLOOD PRESSURE: 120 MMHG | DIASTOLIC BLOOD PRESSURE: 70 MMHG

## 2023-07-12 DIAGNOSIS — E78.5 DYSLIPIDEMIA: ICD-10-CM

## 2023-07-12 DIAGNOSIS — R01.1 CARDIAC MURMUR: ICD-10-CM

## 2023-07-12 DIAGNOSIS — I27.20 PULMONARY HYPERTENSION (HCC): ICD-10-CM

## 2023-07-12 DIAGNOSIS — I11.9 HYPERTENSIVE HEART DISEASE WITHOUT HEART FAILURE: ICD-10-CM

## 2023-07-12 DIAGNOSIS — J44.9 CHRONIC OBSTRUCTIVE PULMONARY DISEASE, UNSPECIFIED COPD TYPE (HCC): ICD-10-CM

## 2023-07-12 DIAGNOSIS — I48.0 PAROXYSMAL ATRIAL FIBRILLATION (HCC): ICD-10-CM

## 2023-07-12 PROCEDURE — 99214 OFFICE O/P EST MOD 30 MIN: CPT | Performed by: INTERNAL MEDICINE

## 2023-07-12 PROCEDURE — 93000 ELECTROCARDIOGRAM COMPLETE: CPT | Performed by: INTERNAL MEDICINE

## 2023-07-12 NOTE — PROGRESS NOTES
Progress note- Cardiology Office  [facility]. Beti King 82 y.o. female MRN: [id number]  : 1935  Unit/Bed#:  Encounter: [id number]      Assessment:     1. Hypertensive heart disease without heart failure    2. Paroxysmal atrial fibrillation (HCC)    3. Cardiac murmur    4. Pulmonary hypertension ([address])    5. Chronic obstructive pulmonary disease, unspecified COPD type ([address])    6. Dyslipidemia        Discussion summary and Plan:       1. Paroxysmal atrial fibrillation. She is maintaining sinus rhythm. As per previous cardiac there is 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  Not on any AV node blocking drug heart rate is around 65 beats per minute. Antithrombotic therapies with Eliquis. Heart rate 88 bpm.  Blood pressure acceptable we will continue to monitor. 2. Hypertensive heart disease without heart failure. She has takes diuretics blood pressure has been acceptable. Same medication     3. Dyslipidemia. She has been on fenofibrate. She is not tolerating very well. Cardiology note reviewed. She could not tolerate other statins. She is doing well on it. And considering her age I will continue same Rx. No change in issues. 4. Cardiac murmur. She does have history of cardiac murmur echo Doppler in May 2021 shows mild-to-moderate MR mild TR EF is acceptable. Echo Doppler reviewed. 5. History of COPD and bronchiectasis. Currently stable patient never smoked her  used to smoke and she worked in [employer]. She is not having any cough at this time. 6. Mild pulmonary hypertension echo Doppler reviewed     7. DJD walks with the help of cane currently getting injections in her feet doing well. Continue current Rx. We will continue to follow.       Patient  was advised and educated to call our office  immediately if  patient has any new symptoms of chest pain/shortness of breath, near-syncope, syncope, light headedness sustained palpitations  or any other cardiovascular symptoms before their scheduled follow-up appointment. Office #[phone]. Thank you for your consultation. If you have any question please call me at [phone]    Counseling :  A description of the counseling. Goals and Barriers. Patient's ability to self care: Yes  Medication side effect reviewed with patient in detail and all their questions answered to their satisfaction. Primary Care Physician  GONZALEZ Mensah      HPI :     Justo Williamson is a 82y.o. year old female who was referred by primary care doctor for  Atrial fibrillation and to establish with practice. Patient who used to live in [place] moved here as her  has passed away. She used to follow up with cardiologist who has note from February 22nd I reviewed. She has medical history significant as per note intermittent /paroxysmal atrial fibrillation, elevated Jose Rico Vasc score, mild pulmonary hypertension, bronchiectasis, DJD and dyslipidemia. She has an echo Doppler done in May 2021 which shows her EF is 55% mild left atrial enlargement with size of 4.4 cm mild-to-moderate MR and mild TR with PA pressure 35 mmHg. She has been doing well her med list was reviewed she is taking Eliquis 2.5 twice a day Lasix 20 mg daily fenofibrate and Xyzal.      She had history of hysterectomy as well as shoulder surgery otherwise she is doing well. History of cholecystectomy    12/16/2022. Patient who has a medical history significant for paroxysmal atrial fibrillation, high [id number] score, mild pulm hypertension, history of bronchiectasis, DJD who came for follow-up. Her previous EF is around 55%. She moved here as her son works here. She walks with the help of a cane but she is pretty independent in her daily activity. Heart positive closed history of hysterectomy. She is otherwise doing well.   No nausea no vomiting no fever no chills no PND no orthopnea no other cardiovascular issues. 7/12/2023. Above reviewed. Patient who has medical history significant for paroxysmal atrial fibrillation, CHADS2- VASc score, mild pulmonary hypertension, history of bronchiectasis, DJD, dyslipidemia who came for follow-up. She walks with the help of cane. She is pretty independent with daily activities. Her other medical history is positive for history of hysterectomy. Currently she is having 6 injections in her foot from a podiatrist which has helped her. EKG shows sinus rhythm heart rate 88 bpm left posterior fascicular block and QS in V1 to V3 due to lead location. She is pretty compliant with her medications. No other issues at this time. Her blood test done with her primary care doctor which shows slightly elevated calcium other labs are acceptable. Review of Systems   Constitutional: Negative for activity change, chills, diaphoresis, fever and unexpected weight change. HENT: Negative for congestion. Eyes: Negative for discharge and redness. Respiratory: Negative for cough, chest tightness, shortness of breath and wheezing. Cardiovascular: Negative. Negative for chest pain, palpitations and leg swelling. Gastrointestinal: Negative for abdominal pain, diarrhea and nausea. Endocrine: Negative. Genitourinary: Negative for decreased urine volume and urgency. Musculoskeletal: Positive for arthralgias and gait problem. Negative for back pain. Skin: Negative for rash and wound. Allergic/Immunologic: Negative. Neurological: Negative for dizziness, seizures, syncope, weakness, light-headedness and headaches. Hematological: Negative. Psychiatric/Behavioral: Negative for agitation and confusion. The patient is not nervous/anxious.         Historical Information   Past Medical History:   Diagnosis Date   • A-fib ([facility])     from prior records   • COPD (chronic obstructive pulmonary disease) ([address])    • Hyperlipidemia    • MR (mitral regurgitation) • Pulmonary hypertension ([address])      History reviewed. No pertinent surgical history. Social History     Substance and Sexual Activity   Alcohol Use Never     Social History     Substance and Sexual Activity   Drug Use Not on file     Social History     Tobacco Use   Smoking Status Never   Smokeless Tobacco Never     Family History:   Family History   Problem Relation Age of Onset   • Stroke Sister        Meds/Allergies     Allergies   Allergen Reactions   • Nitrofurantoin Other (See Comments)   • Tramadol Other (See Comments)       Current Outpatient Medications:   •  apixaban (ELIQUIS) 2.5 mg, Take 1 tablet (2.5 mg total) by mouth 2 (two) times a day, Disp: 180 tablet, Rfl: 3  •  Biotin 1 MG CAPS, Use daily, Disp: , Rfl:   •  Calcium Polycarbophil (Fiber) 625 MG TABS, Take 625 mg by mouth daily, Disp: , Rfl:   •  Cholecalciferol (Vitamin D3) 1.25 MG (80991 UT) TABS, Take by mouth, Disp: , Rfl:   •  fenofibrate 160 MG tablet, , Disp: , Rfl:   •  furosemide (LASIX) 20 mg tablet, Take 20 mg by mouth in the morning, Disp: , Rfl:   •  GNP CRANBERRY EXTRACT PO, Take 600 mg by mouth, Disp: , Rfl:   •  levocetirizine (XYZAL) 5 MG tablet, Take 5 mg by mouth every evening, Disp: , Rfl:   •  Multiple Vitamin (MULTIVITAMIN ADULT PO), Take 1 tablet by mouth daily, Disp: , Rfl:   •  timolol (TIMOPTIC) 0.25 % ophthalmic solution, 2 (two) times a day, Disp: , Rfl:   •  Calcium Carbonate Antacid (CALCIUM CARBONATE PO), Take by mouth in the morning, Disp: , Rfl:     Vitals: Blood pressure 120/70, pulse 88, height 5' (1.524 m), weight 54 kg (119 lb), SpO2 97 %. ?  Body mass index is 23.24 kg/m².   Wt Readings from Last 3 Encounters:   07/12/23 54 kg (119 lb)   02/22/23 55 kg (121 lb 3.2 oz)   02/10/23 56 kg (123 lb 6.4 oz)     Vitals:    07/12/23 1357   Weight: 54 kg (119 lb)     BP Readings from Last 3 Encounters:   07/12/23 120/70   01/12/23 143/87   12/16/22 120/80         Physical Exam  Constitutional:       General: She is not in acute distress. Appearance: She is well-developed. She is not diaphoretic. Neck:      Thyroid: No thyromegaly. Vascular: No JVD. Trachea: No tracheal deviation. Cardiovascular:      Rate and Rhythm: Normal rate and regular rhythm. Heart sounds: S1 normal and S2 normal. Heart sounds not distant. Murmur heard. Systolic (ejection) murmur is present with a grade of 2/6. No friction rub. No gallop. No S3 or S4 sounds. Pulmonary:      Effort: Pulmonary effort is normal. No respiratory distress. Breath sounds: No wheezing or rales. Comments: Bilateral air entry with some coarse breath sounds. Chest:      Chest wall: No tenderness. Abdominal:      General: Bowel sounds are normal. There is no distension. Palpations: Abdomen is soft. Tenderness: There is no abdominal tenderness. Musculoskeletal:         General: No deformity. Cervical back: Neck supple. Skin:     General: Skin is warm and dry. Coloration: Skin is not pale. Findings: No rash. Neurological:      Mental Status: She is alert and oriented to person, place, and time. Psychiatric:         Behavior: Behavior normal.         Judgment: Judgment normal.             Diagnostic Studies Review Cardio:      Echo Doppler done in May 2021 in  [place] shows EF 55%, mild-to-moderate MR, mild TR with PA pressure 34 left atrial size was 4.4 cm. EKG:  Twelve lead EKG 06/10/2022 shows normal sinus rhythm heart rate 65 beats per minute no other abnormality. Twelve-lead EKG done on 12/16/2022 showed normal sinus and heart rate 75 bpm.  No change from other EKG    Twelve-lead EKG done on 7/12/2023 shows normal sinus them heart rate 88 bpm.  Left history of fascicular block. Not much change from previous EKG        Dr. Alexx Montes MD [facility] [employer]      "This note has been constructed using a voice recognition system. Therefore there may be syntax, spelling, and/or grammatical errors. Please call if you have any questions. "

## 2023-10-17 ENCOUNTER — APPOINTMENT (EMERGENCY)
Dept: RADIOLOGY | Facility: HOSPITAL | Age: 88
End: 2023-10-17
Payer: MEDICARE

## 2023-10-17 ENCOUNTER — HOSPITAL ENCOUNTER (EMERGENCY)
Facility: HOSPITAL | Age: 88
Discharge: HOME | End: 2023-10-17
Attending: EMERGENCY MEDICINE
Payer: MEDICARE

## 2023-10-17 VITALS
TEMPERATURE: 96.4 F | WEIGHT: 116.84 LBS | OXYGEN SATURATION: 93 % | HEART RATE: 83 BPM | RESPIRATION RATE: 18 BRPM | BODY MASS INDEX: 22.82 KG/M2 | DIASTOLIC BLOOD PRESSURE: 71 MMHG | SYSTOLIC BLOOD PRESSURE: 126 MMHG

## 2023-10-17 DIAGNOSIS — S80.00XA KNEE CONTUSION: ICD-10-CM

## 2023-10-17 DIAGNOSIS — S00.81XA ABRASION OF FACE, INITIAL ENCOUNTER: ICD-10-CM

## 2023-10-17 DIAGNOSIS — R93.0 ABNORMAL CT OF THE HEAD: ICD-10-CM

## 2023-10-17 DIAGNOSIS — S09.90XA INJURY OF HEAD, INITIAL ENCOUNTER: Primary | ICD-10-CM

## 2023-10-17 PROCEDURE — G1004 CDSM NDSC: HCPCS

## 2023-10-17 PROCEDURE — 72125 CT NECK SPINE W/O DYE: CPT

## 2023-10-17 PROCEDURE — 70450 CT HEAD/BRAIN W/O DYE: CPT

## 2023-10-17 PROCEDURE — 70486 CT MAXILLOFACIAL W/O DYE: CPT

## 2023-10-17 PROCEDURE — 90715 TDAP VACCINE 7 YRS/> IM: CPT | Performed by: EMERGENCY MEDICINE

## 2023-10-17 PROCEDURE — 73564 X-RAY EXAM KNEE 4 OR MORE: CPT

## 2023-10-17 RX ORDER — GINSENG 100 MG
1 CAPSULE ORAL ONCE
Status: COMPLETED | OUTPATIENT
Start: 2023-10-17 | End: 2023-10-17

## 2023-10-17 RX ADMIN — BACITRACIN ZINC 1 LARGE APPLICATION: 500 OINTMENT TOPICAL at 20:49

## 2023-10-17 RX ADMIN — TETANUS TOXOID, REDUCED DIPHTHERIA TOXOID AND ACELLULAR PERTUSSIS VACCINE, ADSORBED 0.5 ML: 5; 2.5; 8; 8; 2.5 SUSPENSION INTRAMUSCULAR at 20:55

## 2023-10-18 NOTE — ED PROVIDER NOTES
History  Chief Complaint   Patient presents with    Fall     States she turned in her driveway and fell . Patient on Eliquis. Abrasions forehead and nose and right knee. Denies LOC. Denies neck/chest/abd pain      Patient is an 82-year-old female with a history of atrial fibrillation, COPD, hyperlipidemia, pulmonary hypertension, ambulatory dysfunction, typically ambulates with a cane, who presents after a mechanical fall. She states that she was attempting to turn around, lost her balance and fell face forward in the driveway. Patient is on Eliquis. She is unsure of the date of her last tetanus booster. She complains of pain to both knees as well. History provided by:  Patient   used: No    Fall  Associated symptoms: no abdominal pain, no back pain, no nausea, no neck pain and no vomiting        Prior to Admission Medications   Prescriptions Last Dose Informant Patient Reported? Taking?    Biotin 1 MG CAPS  Self Yes No   Sig: Use daily   Calcium Carbonate Antacid (CALCIUM CARBONATE PO)  Self Yes No   Sig: Take by mouth in the morning   Calcium Polycarbophil (Fiber) 625 MG TABS  Self Yes No   Sig: Take 625 mg by mouth daily   Cholecalciferol (Vitamin D3) 1.25 MG (37464 UT) TABS  Self Yes No   Sig: Take by mouth   GNP CRANBERRY EXTRACT PO  Self Yes No   Sig: Take 600 mg by mouth   Multiple Vitamin (MULTIVITAMIN ADULT PO)  Self Yes No   Sig: Take 1 tablet by mouth daily   apixaban (ELIQUIS) 2.5 mg   No No   Sig: Take 1 tablet (2.5 mg total) by mouth 2 (two) times a day   fenofibrate 160 MG tablet  Self Yes No   furosemide (LASIX) 20 mg tablet  Self Yes No   Sig: Take 20 mg by mouth in the morning   levocetirizine (XYZAL) 5 MG tablet  Self Yes No   Sig: Take 5 mg by mouth every evening   timolol (TIMOPTIC) 0.25 % ophthalmic solution  Self Yes No   Si (two) times a day      Facility-Administered Medications: None       Past Medical History:   Diagnosis Date    A-fib ([address])     from prior records    COPD (chronic obstructive pulmonary disease) ([address])     Hyperlipidemia     MR (mitral regurgitation)     Pulmonary hypertension ([address])        History reviewed. No pertinent surgical history. Family History   Problem Relation Age of Onset    Stroke Sister      I have reviewed and agree with the history as documented. E-Cigarette/Vaping    E-Cigarette Use Never User      E-Cigarette/Vaping Substances     Social History     Tobacco Use    Smoking status: Never    Smokeless tobacco: Never   Vaping Use    Vaping Use: Never used   Substance Use Topics    Alcohol use: Never    Drug use: Never       Review of Systems   Constitutional:  Negative for chills and fever. Respiratory:  Negative for cough, chest tightness and shortness of breath. Gastrointestinal:  Negative for abdominal pain, diarrhea, nausea and vomiting. Genitourinary:  Negative for dysuria, frequency, hematuria and urgency. Musculoskeletal:  Positive for gait problem. Negative for back pain, neck pain and neck stiffness. Skin:  Positive for wound. Negative for color change, pallor and rash. All other systems reviewed and are negative. Physical Exam  Physical Exam  Vitals and nursing note reviewed. Constitutional:       General: She is not in acute distress. Appearance: She is well-developed. She is not diaphoretic. HENT:      Head: Normocephalic. Abrasion present. Nose: Nasal tenderness present. No nasal deformity or septal deviation. Right Nostril: No epistaxis or septal hematoma. Left Nostril: No epistaxis or septal hematoma. Eyes:      Extraocular Movements: Extraocular movements intact. Conjunctiva/sclera: Conjunctivae normal.      Pupils: Pupils are equal, round, and reactive to light. Cardiovascular:      Rate and Rhythm: Normal rate and regular rhythm. Heart sounds: Normal heart sounds. No murmur heard. Pulmonary:      Effort: Pulmonary effort is normal. No respiratory distress. Breath sounds: Normal breath sounds. Abdominal:      General: Bowel sounds are normal. There is no distension. Palpations: Abdomen is soft. Tenderness: There is no abdominal tenderness. Musculoskeletal:         General: No deformity. Normal range of motion. Cervical back: Normal range of motion and neck supple. Skin:     General: Skin is warm and dry. Capillary Refill: Capillary refill takes less than 2 seconds. Coloration: Skin is not pale. Findings: No rash. Neurological:      General: No focal deficit present. Mental Status: She is alert and oriented to person, place, and time. Cranial Nerves: No cranial nerve deficit. Psychiatric:         Mood and Affect: Mood normal.         Behavior: Behavior normal.         Thought Content:  Thought content normal.         Judgment: Judgment normal.         Vital Signs  ED Triage Vitals   Temperature Pulse Respirations Blood Pressure SpO2   10/17/23 2041 10/17/23 2046 10/17/23 2046 10/17/23 2041 10/17/23 2046   (!) 96.4 °F (35.8 °C) 82 18 152/92 97 %      Temp Source Heart Rate Source Patient Position - Orthostatic VS BP Location FiO2 (%)   10/17/23 2041 10/17/23 2046 10/17/23 2041 10/17/23 2041 --   Tympanic Monitor Lying Left arm       Pain Score       --                  Vitals:    10/17/23 2130 10/17/23 2307 10/17/23 2315 10/17/23 2330   BP: 115/70 166/66 116/66 126/71   Pulse: 80  80 83   Patient Position - Orthostatic VS: Lying            Visual Acuity  Visual Acuity      Flowsheet Row Most Recent Value   R Pupil Size (mm) 3            ED Medications  Medications   tetanus-diphtheria-acellular pertussis (BOOSTRIX) IM injection 0.5 mL (0.5 mL Intramuscular Given 10/17/23 2055)   bacitracin topical ointment 1 large application (1 large application Topical Given 10/17/23 2049)       Diagnostic Studies  Results Reviewed       None                   XR knee 4+ vw left injury   ED Interpretation by Chichi Estrada DO (10/17 2219)   DJD. No fx      Final Result by Mary Jane Jernigan MD (10/18 9622)      Severe osteoarthritis of the left knee lateral tibiofemoral compartment. No acute osseous abnormality            Workstation performed: [id number]         XR knee 4+ vw right injury   ED Interpretation by Nicole Parmar DO (10/17 2219)   nad      Final Result by Mary Jane Jernigan MD (10/18 6660)      No acute osseous abnormality. Workstation performed: [id number]         CT head without contrast   Final Result by Deborah Sanchez MD (10/17 2253)      No acute intracranial hemorrhage or depressed calvarial fracture. Subtle asymmetrical soft tissue prominence and asymmetry of the left cavernous sinus/medial temporal region is seen as discussed above, consider correlation with nonemergent    contrast-enhanced MRI brain for better evaluation and to exclude underlying mass. Workstation performed: [id number]         CT cervical spine without contrast   Final Result by Deborah Sanchez MD (10/17 2235)      Multilevel degenerative changes without acute cervical spine fracture or traumatic malalignment. Other ancillary findings detailed above. Workstation performed: [id number]         CT facial bones without contrast   Final Result by Deborah Sanchez MD (10/17 2246)      No acute maxillofacial bone, orbital, or mandible fracture identified.       Workstation performed: [id number]                    Procedures  CriticalCare Time    Date/Time: 10/17/2023 11:00 PM    Performed by: Nicole Parmar DO  Authorized by: Nicole Parmar DO    Critical care provider statement:     Critical care time (minutes):  35    Critical care time was exclusive of:  Separately billable procedures and treating other patients and teaching time    Critical care was necessary to treat or prevent imminent or life-threatening deterioration of the following conditions:  Trauma    Critical care was time spent personally by me on the following activities:  Obtaining history from patient or surrogate, development of treatment plan with patient or surrogate, evaluation of patient's response to treatment, examination of patient, ordering and performing treatments and interventions, ordering and review of laboratory studies, ordering and review of radiographic studies, re-evaluation of patient's condition and review of old charts    I assumed direction of critical care for this patient from another provider in my specialty: no             ED Course                               SBIRT 20yo+      Flowsheet Row Most Recent Value   Initial Alcohol Screen: US AUDIT-C     1. How often do you have a drink containing alcohol? 0 Filed at: 10/17/2023 2044   Audit-C Score 0 Filed at: 10/17/2023 2044   SHAHAB: How many times in the past year have you. .. Used an illegal drug or used a prescription medication for non-medical reasons? Never Filed at: 10/17/2023 2044                      Medical Decision Making  30-year-old female in the ED after mechanical fall. Patient is on Eliquis. CT head, C-spine, facial bones ordered and reviewed. No acute traumatic pathology noted. Bilateral knee x-rays ordered and reviewed-DJD noted to left knee, but otherwise no acute traumatic pathology. Amount and/or Complexity of Data Reviewed  Radiology: ordered and independent interpretation performed. Risk  OTC drugs. Prescription drug management.              Disposition  Final diagnoses:   Injury of head, initial encounter   Abrasion of face, initial encounter   Knee contusion   Abnormal CT of the head     Time reflects when diagnosis was documented in both MDM as applicable and the Disposition within this note       Time User Action Codes Description Comment    10/17/2023 11:07 PM Idelle Frankel O Add [S09.90XA] Injury of head, initial encounter     10/17/2023 11:07 PM Romeo Moses O Add [S00.81XA] Abrasion of face, initial encounter 10/17/2023 11:07 PM Flako Medicus O Add [S80.00XA] Knee contusion     10/17/2023 11:08 PM Flako Medicus O Add [R93.0] Abnormal CT of the head           ED Disposition       ED Disposition   Discharge    Condition   Stable    Date/Time   Tue Oct 17, 2023 2306    Comment   Jluis Fair [facility] discharge to home/self care.                    Follow-up Information       Follow up With Specialties Details Why Contact Info Additional Information    GONZALEZ Hi Nurse Practitioner Schedule an appointment as soon as possible for a visit in 1 day for follow up, and for a non emergent MRI of your brain [address] [facility] [address]       [address] [place] [address] Orthopedic Surgery Schedule an appointment as soon as possible for a visit in 1 day for follow up for knee pain [address], [place] [address] [facility] [address] [facility] [address] [facility] Specialists Marina Edwards, [address], [address] [facility], [address]   [phone]            Discharge Medication List as of 10/17/2023 11:26 PM        CONTINUE these medications which have NOT CHANGED    Details   apixaban (ELIQUIS) 2.5 mg Take 1 tablet (2.5 mg total) by mouth 2 (two) times a day, Starting Wed 7/12/2023, Normal      Biotin 1 MG CAPS Use daily, Historical Med      Calcium Carbonate Antacid (CALCIUM CARBONATE PO) Take by mouth in the morning, Historical Med      Calcium Polycarbophil (Fiber) 625 MG TABS Take 625 mg by mouth daily, Historical Med      Cholecalciferol (Vitamin D3) 1.25 MG (44915 UT) TABS Take by mouth, Historical Med      fenofibrate 160 MG tablet Starting Wed 11/9/2022, Historical Med      furosemide (LASIX) 20 mg tablet Take 20 mg by mouth in the morning, Historical Med      GNP CRANBERRY EXTRACT PO Take 600 mg by mouth, Historical Med      levocetirizine (XYZAL) 5 MG tablet Take 5 mg by mouth every evening, Historical Med      Multiple Vitamin (MULTIVITAMIN ADULT PO) Take 1 tablet by mouth daily, Historical Med      timolol (TIMOPTIC) 0.25 % ophthalmic solution 2 (two) times a day, Historical Med             No discharge procedures on file.     PDMP Review       None            ED Provider  Electronically Signed by             Eden Wheatley DO  10/18/23 6379

## 2023-10-18 NOTE — DISCHARGE INSTRUCTIONS
Keep ace wrap in place while awake, and limit weight bearing on affected knee  Return to the ER for further concerns or worsening symptoms  Follow up with your primary care physician and orthopedics in 1-2 days  Apply bacitracin to abrasions twice daily

## 2023-11-01 DIAGNOSIS — I48.0 PAROXYSMAL ATRIAL FIBRILLATION (HCC): ICD-10-CM

## 2023-11-05 ENCOUNTER — HOSPITAL ENCOUNTER (EMERGENCY)
Facility: HOSPITAL | Age: 88
Discharge: HOME | End: 2023-11-05
Attending: EMERGENCY MEDICINE
Payer: MEDICARE

## 2023-11-05 VITALS
TEMPERATURE: 98.7 F | WEIGHT: 118 LBS | HEART RATE: 70 BPM | DIASTOLIC BLOOD PRESSURE: 95 MMHG | RESPIRATION RATE: 18 BRPM | OXYGEN SATURATION: 95 % | SYSTOLIC BLOOD PRESSURE: 149 MMHG | BODY MASS INDEX: 23.05 KG/M2

## 2023-11-05 DIAGNOSIS — N30.01 ACUTE CYSTITIS WITH HEMATURIA: Primary | ICD-10-CM

## 2023-11-05 LAB
BACTERIA UR QL AUTO: ABNORMAL /HPF
BILIRUB UR QL STRIP: NEGATIVE
CLARITY UR: ABNORMAL
COLOR UR: YELLOW
GLUCOSE UR STRIP-MCNC: NEGATIVE MG/DL
HGB UR QL STRIP.AUTO: ABNORMAL
KETONES UR STRIP-MCNC: NEGATIVE MG/DL
LEUKOCYTE ESTERASE UR QL STRIP: ABNORMAL
NITRITE UR QL STRIP: NEGATIVE
NON-SQ EPI CELLS URNS QL MICRO: ABNORMAL /HPF
PH UR STRIP.AUTO: 7 [PH]
PROT UR STRIP-MCNC: ABNORMAL MG/DL
RBC #/AREA URNS AUTO: ABNORMAL /HPF
SP GR UR STRIP.AUTO: 1.01 (ref 1–1.03)
UROBILINOGEN UR QL STRIP.AUTO: 0.2 E.U./DL
WBC #/AREA URNS AUTO: ABNORMAL /HPF

## 2023-11-05 PROCEDURE — 99284 EMERGENCY DEPT VISIT MOD MDM: CPT | Performed by: EMERGENCY MEDICINE

## 2023-11-05 PROCEDURE — 87077 CULTURE AEROBIC IDENTIFY: CPT | Performed by: EMERGENCY MEDICINE

## 2023-11-05 PROCEDURE — 87186 SC STD MICRODIL/AGAR DIL: CPT | Performed by: EMERGENCY MEDICINE

## 2023-11-05 PROCEDURE — 81001 URINALYSIS AUTO W/SCOPE: CPT | Performed by: EMERGENCY MEDICINE

## 2023-11-05 PROCEDURE — 99283 EMERGENCY DEPT VISIT LOW MDM: CPT

## 2023-11-05 PROCEDURE — 87086 URINE CULTURE/COLONY COUNT: CPT | Performed by: EMERGENCY MEDICINE

## 2023-11-05 RX ORDER — LEVOFLOXACIN 500 MG/1
500 TABLET, FILM COATED ORAL DAILY
Qty: 7 TABLET | Refills: 0 | Status: SHIPPED | OUTPATIENT
Start: 2023-11-05 | End: 2023-11-12

## 2023-11-05 RX ADMIN — LEVOFLOXACIN 750 MG: 500 TABLET, FILM COATED ORAL at 11:29

## 2023-11-05 NOTE — ED PROVIDER NOTES
History  Chief Complaint   Patient presents with    Possible UTI     2 hors ago when wiping , noted pink blood on the tissue. Had a UTI a week ago. Patient has a history of recurrent urinary tract infections. She finished culture directed antibiotics for another UTI last weekend. Patient was back to her usual health until today when she urinated normally without pain or discomfort or gross hematuria. When she wiped herself however she noted light blood on the tissue. There is no blood in the toilet. There is no clots patient did not have dysuria, no abdominal or flank pain. No fever. Patient is on Eliquis. Of note, she had a total hysterectomy including the cervix several decades ago        Prior to Admission Medications   Prescriptions Last Dose Informant Patient Reported? Taking?    Biotin 1 MG CAPS  Self Yes No   Sig: Use daily   Calcium Carbonate Antacid (CALCIUM CARBONATE PO)  Self Yes No   Sig: Take by mouth in the morning   Calcium Polycarbophil (Fiber) 625 MG TABS  Self Yes No   Sig: Take 625 mg by mouth daily   Cholecalciferol (Vitamin D3) 1.25 MG (34847 UT) TABS  Self Yes No   Sig: Take by mouth   GNP CRANBERRY EXTRACT PO  Self Yes No   Sig: Take 600 mg by mouth   Multiple Vitamin (MULTIVITAMIN ADULT PO)  Self Yes No   Sig: Take 1 tablet by mouth daily   apixaban (ELIQUIS) 2.5 mg   No No   Sig: Take 1 tablet (2.5 mg total) by mouth 2 (two) times a day   fenofibrate 160 MG tablet  Self Yes No   furosemide (LASIX) 20 mg tablet  Self Yes No   Sig: Take 20 mg by mouth in the morning   levocetirizine (XYZAL) 5 MG tablet  Self Yes No   Sig: Take 5 mg by mouth every evening   timolol (TIMOPTIC) 0.25 % ophthalmic solution  Self Yes No   Si (two) times a day      Facility-Administered Medications: None       Past Medical History:   Diagnosis Date    A-fib ([address])     from prior records    COPD (chronic obstructive pulmonary disease) (HCC)     Hyperlipidemia     MR (mitral regurgitation)     Pulmonary hypertension ([address])        History reviewed. No pertinent surgical history. Family History   Problem Relation Age of Onset    Stroke Sister      I have reviewed and agree with the history as documented. E-Cigarette/Vaping    E-Cigarette Use Never User      E-Cigarette/Vaping Substances     Social History     Tobacco Use    Smoking status: Never    Smokeless tobacco: Never   Vaping Use    Vaping Use: Never used   Substance Use Topics    Alcohol use: Never    Drug use: Never       Review of Systems   Constitutional:  Negative for chills and fever. HENT:  Negative for congestion. Eyes:  Negative for visual disturbance. Respiratory:  Negative for cough and shortness of breath. Cardiovascular:  Negative for chest pain. Gastrointestinal:  Negative for abdominal pain and vomiting. Genitourinary:  Positive for hematuria and vaginal bleeding. Negative for decreased urine volume, difficulty urinating, dysuria, flank pain, frequency, genital sores, menstrual problem, pelvic pain, vaginal discharge and vaginal pain. Musculoskeletal:  Positive for back pain. Skin:  Negative for color change and rash. Neurological:  Negative for headaches. Hematological:  Does not bruise/bleed easily. Psychiatric/Behavioral:  Negative for confusion. Physical Exam  Physical Exam  Vitals and nursing note reviewed. Constitutional:       Appearance: Normal appearance. HENT:      Head: Normocephalic. Right Ear: External ear normal.      Left Ear: External ear normal.      Nose: Nose normal.      Mouth/Throat:      Mouth: Mucous membranes are moist.   Eyes:      Conjunctiva/sclera: Conjunctivae normal.   Cardiovascular:      Rate and Rhythm: Normal rate. Pulses: Normal pulses. Pulmonary:      Effort: Pulmonary effort is normal.   Abdominal:      Palpations: Abdomen is soft. Tenderness: There is no abdominal tenderness. Genitourinary:     General: Normal vulva.       Comments: Vaginal exam shows no areas of bleeding. Straight cath is performed with evacuation of slightly cloudy urine with a small amount of blood tinge at the end of the bladder emptying. Musculoskeletal:         General: Normal range of motion. Cervical back: Normal range of motion. Skin:     General: Skin is warm and dry. Capillary Refill: Capillary refill takes less than 2 seconds. Neurological:      General: No focal deficit present. Mental Status: She is alert. Psychiatric:         Mood and Affect: Mood normal.         Vital Signs  ED Triage Vitals [11/05/23 1014]   Temperature Pulse Respirations Blood Pressure SpO2   98.7 °F (37.1 °C) 68 18 149/95 97 %      Temp src Heart Rate Source Patient Position - Orthostatic VS BP Location FiO2 (%)   -- -- -- -- --      Pain Score       No Pain           Vitals:    11/05/23 1014   BP: 149/95   Pulse: 68         Visual Acuity      ED Medications  Medications   levofloxacin (LEVAQUIN) tablet 750 mg (has no administration in time range)       Diagnostic Studies  Results Reviewed       Procedure Component Value Units Date/Time    Urine Microscopic [[id number]]  (Abnormal) Collected: 11/05/23 1053    Lab Status: Final result Specimen: Urine, Clean Catch Updated: 11/05/23 1108     RBC, UA 10-20 /hpf      WBC, UA 30-50 /hpf      Epithelial Cells Occasional /hpf      Bacteria, UA Occasional /hpf     UA (URINE) with reflex to Scope [[id number]]  (Abnormal) Collected: 11/05/23 1053    Lab Status: Final result Specimen: Urine, Clean Catch Updated: 11/05/23 1101     Color, UA Yellow     Clarity, UA Cloudy     Specific Gravity, UA 1.015     pH, UA 7.0     Leukocytes, UA Large     Nitrite, UA Negative     Protein, UA Trace mg/dl      Glucose, UA Negative mg/dl      Ketones, UA Negative mg/dl      Urobilinogen, UA 0.2 E.U./dl      Bilirubin, UA Negative     Occult Blood, UA Large    Urine culture [[id number]] Collected: 11/05/23 1053    Lab Status:  In process Specimen: Urine, Clean Catch Updated: 11/05/23 1057                   No orders to display              Procedures  Procedures         ED Course                               SBIRT 20yo+      Flowsheet Row Most Recent Value   Initial Alcohol Screen: US AUDIT-C     1. How often do you have a drink containing alcohol? 0 Filed at: 11/05/2023 1016   2. How many drinks containing alcohol do you have on a typical day you are drinking? 0 Filed at: 11/05/2023 1016   3a. Male UNDER 65: How often do you have five or more drinks on one occasion? 0 Filed at: 11/05/2023 1016   3b. FEMALE Any Age, or MALE 65+: How often do you have 4 or more drinks on one occassion? 0 Filed at: 11/05/2023 1016   Audit-C Score 0 Filed at: 11/05/2023 1016   SHAHAB: How many times in the past year have you. .. Used an illegal drug or used a prescription medication for non-medical reasons? Never Filed at: 11/05/2023 1016                      Medical Decision Making  Bleeding appears to be hematuria not vaginal.  Check urinalysis for recurrent UTI. Patient has recently been on Keflex and Zithromax for UTI    Amount and/or Complexity of Data Reviewed  Labs: ordered. Risk  Prescription drug management. Disposition  Final diagnoses:   Acute cystitis with hematuria     Time reflects when diagnosis was documented in both MDM as applicable and the Disposition within this note       Time User Action Codes Description Comment    11/5/2023 11:21 AM Eric SNOWDEN Add [N30.01] Acute cystitis with hematuria           ED Disposition       ED Disposition   Discharge    Condition   Stable    Date/Time   Sun Nov 5, 2023 1121    [address] discharge to home/self care.                    Follow-up Information       Follow up With Specialties Details Why [address] [facility], APN-C Nurse Practitioner   [facility] [address]      Karis Harding MD Urology Schedule an appointment as soon as possible for a visit   [phone] [address]  [phone]              Patient's Medications   Discharge Prescriptions    LEVOFLOXACIN (LEVAQUIN) 500 MG TABLET    Take 1 tablet (500 mg total) by mouth daily for 7 days       Start Date: 11/5/2023 End Date: 11/12/2023       Order Dose: 500 mg       Quantity: 7 tablet    Refills: 0       No discharge procedures on file.     PDMP Review       None            ED Provider  Electronically Signed by             Kelle Greco MD  11/05/23 8840

## 2023-11-07 LAB — BACTERIA UR CULT: ABNORMAL

## 2023-11-10 ENCOUNTER — HOSPITAL ENCOUNTER (OUTPATIENT)
Dept: MRI IMAGING | Facility: HOSPITAL | Age: 88
End: 2023-11-10
Payer: MEDICARE

## 2023-11-10 DIAGNOSIS — R93.0 ABNORMAL CT OF THE HEAD: ICD-10-CM

## 2023-11-10 PROCEDURE — 70553 MRI BRAIN STEM W/O & W/DYE: CPT

## 2023-11-10 PROCEDURE — A9585 GADOBUTROL INJECTION: HCPCS | Performed by: RADIOLOGY

## 2023-11-10 RX ORDER — GADOBUTROL 604.72 MG/ML
5 INJECTION INTRAVENOUS
Status: COMPLETED | OUTPATIENT
Start: 2023-11-10 | End: 2023-11-10

## 2023-11-10 RX ADMIN — GADOBUTROL 5 ML: 604.72 INJECTION INTRAVENOUS at 13:32

## 2023-11-21 ENCOUNTER — TELEPHONE (OUTPATIENT)
Dept: NEUROSURGERY | Facility: CLINIC | Age: 88
End: 2023-11-21

## 2023-11-21 NOTE — TELEPHONE ENCOUNTER
PT called to schedule appt, states PCP wants to order another MRI of brain due to abnormal  finding. Pt states she is more comfortable dealing with neurosurgery since PCP is suggesting another MRI. Referral completed and pt will wait for our intake call.

## 2023-11-28 ENCOUNTER — TELEPHONE (OUTPATIENT)
Dept: NEUROSURGERY | Facility: CLINIC | Age: 88
End: 2023-11-28

## 2023-12-08 ENCOUNTER — HOSPITAL ENCOUNTER (OUTPATIENT)
Dept: RADIOLOGY | Facility: HOSPITAL | Age: 88
Discharge: HOME | End: 2023-12-08
Attending: SPECIALIST
Payer: MEDICARE

## 2023-12-08 DIAGNOSIS — N30.20 OTHER CHRONIC CYSTITIS WITHOUT HEMATURIA: ICD-10-CM

## 2023-12-08 PROCEDURE — 76775 US EXAM ABDO BACK WALL LIM: CPT

## 2023-12-18 ENCOUNTER — TELEPHONE (OUTPATIENT)
Dept: NEUROSURGERY | Facility: CLINIC | Age: 88
End: 2023-12-18

## 2023-12-18 NOTE — TELEPHONE ENCOUNTER
Returned pt VM to verify address and time for appt on 1/24/24 at [address] arrival time at [address]

## 2024-01-23 PROBLEM — I67.1 CEREBRAL ANEURYSM, NONRUPTURED: Status: ACTIVE | Noted: 2024-01-23

## 2024-01-23 NOTE — ASSESSMENT & PLAN NOTE
New evaluation of a cavernous aneurysm  Noted on work up after a fall while on Eliquis 10/2023. Also seen on imaging per patient about 5-6 years ago.   Non-smoker, BP controlled. Possible aneurysm hx in sister with intracerebral hemorrhage in her 50s  No new neurologic complaints.  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 CT angiogram of the "[employer] of [place] is recommended for further evaluation. Partially imaged T2 hyperintense well-circumscribed lesion along the left lung apex in the left paravertebral region at T1-T2 on  imaging. Contrast-enhanced MRI of the cervical spine is recommended for further evaluation  CT head 10/17/23: No acute intracranial hemorrhage or depressed calvarial fracture. Subtle asymmetrical soft tissue prominence and asymmetry of the left cavernous sinus/medial temporal region is seen as discussed above, consider correlation with nonemergent contrast-enhanced MRI brain for better evaluation and to exclude underlying mass    Plan:  Reviewed images with patient, son, and Dr. Cr. Large asymptomatic left cavernous aneurysm noted. She is not interested in treatment.   Discussed natural history of aneurysms and risk of rupture. Also discussed carotid cavernous fistula and symptoms associated with this.  Discussed genetic component to aneurysms as well as modifiable risk factors such as smoking and HTN.  Given possible aneurysm in her sister, discussed screening of her children/their cousins and adult grandchildren with MRA head. If negative, can be re-screened every 10 years.  Reviewed red flag s/s including severe headache, double vision, inability to move the left eye, left eye bulging, pain, erythema, etc.  Follow up as needed.   Call with any questions or concerns.

## 2024-01-24 ENCOUNTER — OFFICE VISIT (OUTPATIENT)
Dept: NEUROSURGERY | Facility: CLINIC | Age: 89
End: 2024-01-24
Payer: MEDICARE

## 2024-01-24 VITALS
OXYGEN SATURATION: 97 % | DIASTOLIC BLOOD PRESSURE: 80 MMHG | HEIGHT: 60 IN | RESPIRATION RATE: 17 BRPM | WEIGHT: 118 LBS | BODY MASS INDEX: 23.16 KG/M2 | TEMPERATURE: 97.1 F | SYSTOLIC BLOOD PRESSURE: 136 MMHG | HEART RATE: 71 BPM

## 2024-01-24 DIAGNOSIS — I67.1 CEREBRAL ANEURYSM, NONRUPTURED: Primary | ICD-10-CM

## 2024-01-24 PROCEDURE — 99203 OFFICE O/P NEW LOW 30 MIN: CPT | Performed by: PHYSICIAN ASSISTANT

## 2024-01-24 NOTE — PROGRESS NOTES
Neurosurgery Office Note  Alea Lovelace 88 y.o. female MRN: [id number]      Assessment/Plan     Cerebral aneurysm, nonruptured  New evaluation of a cavernous aneurysm  Noted on work up after a fall while on Eliquis 10/2023. Also seen on imaging per patient about 5-6 years ago.   Non-smoker, BP controlled. Possible aneurysm hx in sister with intracerebral hemorrhage in her 50s  No new neurologic complaints.  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 CT angiogram of the [place] of [place] is recommended for further evaluation. Partially imaged T2 hyperintense well-circumscribed lesion along the left lung apex in the left paravertebral region at T1-T2 on  imaging. Contrast-enhanced MRI of the cervical spine is recommended for further evaluation  CT head 10/17/23: No acute intracranial hemorrhage or depressed calvarial fracture. Subtle asymmetrical soft tissue prominence and asymmetry of the left cavernous sinus/medial temporal region is seen as discussed above, consider correlation with nonemergent contrast-enhanced MRI brain for better evaluation and to exclude underlying mass    Plan:  Reviewed images with patient, son, and Dr. Cr. Large asymptomatic left cavernous aneurysm noted. She is not interested in treatment.   Discussed natural history of aneurysms and risk of rupture. Also discussed carotid cavernous fistula and symptoms associated with this.  Discussed genetic component to aneurysms as well as modifiable risk factors such as smoking and HTN.  Given possible aneurysm in her sister, discussed screening of her children/their cousins and adult grandchildren with MRA head. If negative, can be re-screened every 10 years.  Reviewed red flag s/s including severe headache, double vision, inability to move the left eye, left eye bulging, pain,  erythema, etc.  Follow up as needed.   Call with any questions or concerns.       Diagnoses and all orders for this visit:    Cerebral aneurysm, nonruptured          I have spent a total time of 35 minutes on 01/24/24 in caring for this patient including Diagnostic results, Risks and benefits of tx options, Instructions for management, Patient and family education, Risk factor reductions, Impressions, Counseling / Coordination of care, Documenting in the medical record, Reviewing / ordering tests, medicine, procedures  , Obtaining or reviewing history  , and Communicating with other healthcare professionals .      CHIEF COMPLAINT    Chief Complaint   Patient presents with    Consult     Aneurysm        HISTORY    History of Present Illness     88 y.o. year old female     88 year old female seen for evaluation of a left cavernous carotid aneurysm. This was noted on ED evaluation with CT head after a fall on Eliquis 10/2023. States that about 5-6 years ago she was hospitalized while living in [place] and at that time they found the aneurysm. She saw a specialist who did not recommend additional imaging or intervention. Unsure of family history of aneurysm, mentions that her sister had an intracerebral hemorrhage in her 50s and did have to have surgery, but she is unsure of the details. Non-smoker. BP is controlled. She denies headache, blurry or double vision, dizziness, difficulty speaking, numbness or weakness in her arms or legs.         See Discussion    REVIEW OF SYSTEMS    Review of Systems   Constitutional: Negative.    HENT: Negative.     Eyes: Negative.    Respiratory: Negative.     Cardiovascular: Negative.    Gastrointestinal: Negative.    Endocrine: Negative.    Genitourinary: Negative.    Musculoskeletal: Negative.    Skin: Negative.    Allergic/Immunologic: Negative.    Neurological:  Seizures: family history: sister.   Hematological:  Bruises/bleeds easily.   Psychiatric/Behavioral: Negative.         ROS  obtained by MA. Reviewed. See HPI.     Meds/Allergies     Current Outpatient Medications   Medication Sig Dispense Refill    apixaban (ELIQUIS) 2.5 mg Take 1 tablet (2.5 mg total) by mouth 2 (two) times a day 180 tablet 3    Biotin 1 MG CAPS Use daily      Calcium Polycarbophil (Fiber) 625 MG TABS Take 625 mg by mouth daily      Cholecalciferol (Vitamin D3) 1.25 MG (08768 UT) TABS Take by mouth      fenofibrate 160 MG tablet       furosemide (LASIX) 20 mg tablet Take 20 mg by mouth in the morning      GNP CRANBERRY EXTRACT PO Take 600 mg by mouth      levocetirizine (XYZAL) 5 MG tablet Take 5 mg by mouth every evening      Multiple Vitamin (MULTIVITAMIN ADULT PO) Take 1 tablet by mouth daily      timolol (TIMOPTIC) 0.25 % ophthalmic solution 2 (two) times a day      Calcium Carbonate Antacid (CALCIUM CARBONATE PO) Take by mouth in the morning (Patient not taking: Reported on 1/24/2024)       No current facility-administered medications for this visit.       Allergies   Allergen Reactions    Nitrofurantoin Other (See Comments)    Tramadol Other (See Comments)       PAST HISTORY    Past Medical History:   Diagnosis Date    A-fib (HCC)     from prior records    Cerebral aneurysm, nonruptured 01/23/2024    COPD (chronic obstructive pulmonary disease) ([facility])     Hyperlipidemia     MR (mitral regurgitation)     Pulmonary hypertension ([facility])        History reviewed. No pertinent surgical history.    Social History     Tobacco Use    Smoking status: Never    Smokeless tobacco: Never   Vaping Use    Vaping status: Never Used   Substance Use Topics    Alcohol use: Never    Drug use: Never       Family History   Problem Relation Age of Onset    Stroke Sister          Above history personally reviewed.       EXAM    Vitals:Blood pressure 136/80, pulse 71, temperature (!) 97.1 °F (36.2 °C), temperature source Temporal, resp. rate 17, height 5' (1.524 m), weight 53.5 kg (118 lb), SpO2 97%.,Body mass index is 23.05 kg/m².      Physical Exam  Vitals reviewed.   Constitutional:       General: She is awake.      Appearance: Normal appearance.   HENT:      Head: Normocephalic and atraumatic.   Eyes:      Extraocular Movements: EOM normal.      Conjunctiva/sclera: Conjunctivae normal.      Pupils: Pupils are equal, round, and reactive to light.   Cardiovascular:      Rate and Rhythm: Normal rate.   Pulmonary:      Effort: Pulmonary effort is normal.   Skin:     General: Skin is warm and dry.   Neurological:      Mental Status: She is alert and oriented to person, place, and time.      Motor: Motor strength is normal.     Coordination: Finger-Nose-Finger Test normal.      Deep Tendon Reflexes:      Reflex Scores:       Bicep reflexes are 2+ on the right side and 2+ on the left side.       Brachioradialis reflexes are 2+ on the right side and 2+ on the left side.       Patellar reflexes are 2+ on the right side and 2+ on the left side.  Psychiatric:         Attention and Perception: Attention and perception normal.         Mood and Affect: Mood and affect normal.         Speech: Speech normal.         Behavior: Behavior normal. Behavior is cooperative.         Thought Content: Thought content normal.         Cognition and Memory: Cognition and memory normal.         Judgment: Judgment normal.         Neurologic Exam     Mental Status   Oriented to person, place, and time.   Oriented to city.   Oriented to year and month.   Follows 2 step commands.   Attention: normal. Concentration: normal.   Speech: speech is normal   Level of consciousness: alert  Knowledge: good. Able to perform simple calculations.   Able to name object. Normal comprehension.     Cranial Nerves     CN III, IV, VI   Pupils are equal, round, and reactive to light.  Extraocular motions are normal.   Right pupil: Shape: regular. Reactivity: brisk. Consensual response: intact.   Left pupil: Shape: regular. Reactivity: brisk. Consensual response: intact.   CN III: no CN III  palsy  CN VI: no CN VI palsy  Nystagmus: none   Ophthalmoparesis: none  Upgaze: normal  Downgaze: normal  Conjugate gaze: present    CN V   Facial sensation intact.     CN VII   Facial expression full, symmetric.     CN VIII   Hearing: intact    CN XI   Right trapezius strength: normal  Left trapezius strength: normal    CN XII   CN XII normal.   Slight left eye ptosis     Motor Exam   Muscle bulk: normal  Overall muscle tone: normal  Right arm pronator drift: absent  Left arm pronator drift: absent    Strength   Strength 5/5 throughout.     Sensory Exam   Light touch normal.     Gait, Coordination, and Reflexes     Coordination   Finger to nose coordination: normal    Tremor   Resting tremor: absent  Intention tremor: absent  Action tremor: absent    Reflexes   Right brachioradialis: 2+  Left brachioradialis: 2+  Right biceps: 2+  Left biceps: 2+  Right patellar: 2+  Left patellar: 2+  Right Akers: absent  Left Akers: absent  Right ankle clonus: absent  Left ankle clonus: absent        MEDICAL DECISION MAKING    Imaging Studies:     No results found.    I have personally reviewed pertinent reports.   and I have personally reviewed pertinent films in PACS

## 2024-02-14 ENCOUNTER — OFFICE VISIT (OUTPATIENT)
Dept: OBGYN CLINIC | Facility: CLINIC | Age: 89
End: 2024-02-14
Payer: MEDICARE

## 2024-02-14 VITALS
BODY MASS INDEX: 23.16 KG/M2 | HEART RATE: 90 BPM | HEIGHT: 60 IN | SYSTOLIC BLOOD PRESSURE: 125 MMHG | DIASTOLIC BLOOD PRESSURE: 85 MMHG | WEIGHT: 118 LBS

## 2024-02-14 DIAGNOSIS — M17.12 PRIMARY OSTEOARTHRITIS OF LEFT KNEE: Primary | ICD-10-CM

## 2024-02-14 PROCEDURE — 20610 DRAIN/INJ JOINT/BURSA W/O US: CPT | Performed by: ORTHOPAEDIC SURGERY

## 2024-02-14 PROCEDURE — 99203 OFFICE O/P NEW LOW 30 MIN: CPT | Performed by: ORTHOPAEDIC SURGERY

## 2024-02-14 RX ADMIN — DEXAMETHASONE SODIUM PHOSPHATE 40 MG: 10 INJECTION, SOLUTION INTRAMUSCULAR; INTRAVENOUS at 11:00

## 2024-02-14 RX ADMIN — LIDOCAINE HYDROCHLORIDE 4 ML: 10 INJECTION, SOLUTION INFILTRATION; PERINEURAL at 11:00

## 2024-02-14 NOTE — PROGRESS NOTES
Assessment/Plan:  1. Primary osteoarthritis of left knee  Large joint arthrocentesis: L knee        Scribe Attestation      I,:  Myles Saravia am acting as a scribe while in the presence of the attending physician.:       I,:  Phuc Torres, DO personally performed the services described in this documentation    as scribed in my presence.:               Alea has severe lateral compartment osteoarthritis.  Her knee is stable to my examination.  We discussed treatment options for her.  Her arthritis is advanced enough that she could consider a knee joint replacement though she remains fairly functional.  She is not interested in surgery due to her age.  Other treatment options available are cortisone steroid injection and joint lubricating injections.  We discussed that typically steroid injections are the initial treatment.  We discussed the risks and benefits of receiving a steroid injection.  The patient was amenable to proceed and provided verbal consent.  She tolerated the procedure well.  Postinjection instructions were provided.  Should she not find relief I would like her to contact my office and we will order Euflexxa injections.  We discussed medication in detail.  That would be a series of 3 injections over a 3-week period.  She verbalized understanding.    Large joint arthrocentesis: L knee  Universal Protocol:  Consent: Verbal consent obtained.  Risks and benefits: risks, benefits and alternatives were discussed  Consent given by: patient  Timeout called at: 2/14/2024 11:34 AM.  Patient understanding: patient states understanding of the procedure being performed  Patient consent: the patient's understanding of the procedure matches consent given  Patient identity confirmed: verbally with patient  Supporting Documentation  Indications: pain   Procedure Details  Location: knee - L knee  Preparation: Patient was prepped and draped in the usual sterile fashion  Needle size: 22 G  Ultrasound guidance:  no  Approach: anterolateral  Medications administered: 4 mL lidocaine 1 %; 40 mg dexamethasone 100 mg/10 mL    Patient tolerance: patient tolerated the procedure well with no immediate complications  Dressing:  Sterile dressing applied            Subjective:   Alea Lovelace is a 88 y.o. female who presents for evaluation of her left knee.  Alea had suffered a fall back in October 2023.  She was evaluated in the emergency department where she received x-rays of her knees.  X-rays of the left knee showed severe arthritic changes in the lateral compartment.  She was asked to follow-up with orthopedics if she was experiencing knee pain.  Alea's chief complaint is of an intermittent ache at the lateral aspect of the left knee particularly when standing from a seated position.  She notes the occasional sense of instability.  Prolonged walking or standing can cause an exacerbation of symptoms.  She is better with rest.  She states she has a known history of severe arthritic changes in both feet.  Typically her foot pain is worse than her knee.  She does see another provider for this.      Review of Systems   Constitutional:  Negative for chills, fever and unexpected weight change.   HENT:  Negative for hearing loss, nosebleeds and sore throat.    Eyes:  Negative for pain, redness and visual disturbance.   Respiratory:  Negative for cough, shortness of breath and wheezing.    Cardiovascular:  Negative for chest pain, palpitations and leg swelling.   Gastrointestinal:  Negative for abdominal pain, nausea and vomiting.   Endocrine: Negative for polydipsia and polyuria.   Genitourinary:  Negative for dysuria and hematuria.   Musculoskeletal:         See HPI   Skin:  Negative for rash and wound.   Neurological:  Negative for dizziness, numbness and headaches.   Psychiatric/Behavioral:  Negative for decreased concentration and suicidal ideas. The patient is not nervous/anxious.          Past Medical History:    Diagnosis Date    A-fib (HCC)     from prior records    Cerebral aneurysm, nonruptured 01/23/2024    COPD (chronic obstructive pulmonary disease) (HCC)     Heart disease 2016    Hyperlipidemia     MR (mitral regurgitation)     Osteoarthritis 2020    Pulmonary hypertension (HCC)     Stroke (HCC)     Sister       Past Surgical History:   Procedure Laterality Date    SHOULDER SURGERY  2016       Family History   Problem Relation Age of Onset    Stroke Sister        Social History     Occupational History    Not on file   Tobacco Use    Smoking status: Never    Smokeless tobacco: Never   Vaping Use    Vaping status: Never Used   Substance and Sexual Activity    Alcohol use: Not Currently    Drug use: Never    Sexual activity: Not Currently     Partners: Male     Birth control/protection: Female Sterilization         Current Outpatient Medications:     apixaban (ELIQUIS) 2.5 mg, Take 1 tablet (2.5 mg total) by mouth 2 (two) times a day, Disp: 180 tablet, Rfl: 3    Biotin 1 MG CAPS, Use daily, Disp: , Rfl:     Calcium Carbonate Antacid (CALCIUM CARBONATE PO), Take by mouth in the morning (Patient not taking: Reported on 1/24/2024), Disp: , Rfl:     Calcium Polycarbophil (Fiber) 625 MG TABS, Take 625 mg by mouth daily, Disp: , Rfl:     Cholecalciferol (Vitamin D3) 1.25 MG (11425 UT) TABS, Take by mouth, Disp: , Rfl:     fenofibrate 160 MG tablet, , Disp: , Rfl:     furosemide (LASIX) 20 mg tablet, Take 20 mg by mouth in the morning, Disp: , Rfl:     GNP CRANBERRY EXTRACT PO, Take 600 mg by mouth, Disp: , Rfl:     levocetirizine (XYZAL) 5 MG tablet, Take 5 mg by mouth every evening, Disp: , Rfl:     Multiple Vitamin (MULTIVITAMIN ADULT PO), Take 1 tablet by mouth daily, Disp: , Rfl:     timolol (TIMOPTIC) 0.25 % ophthalmic solution, 2 (two) times a day, Disp: , Rfl:     Allergies   Allergen Reactions    Nitrofurantoin Other (See Comments)    Tramadol Other (See Comments)       Objective:  Vitals:    02/14/24 1100   BP:  125/85   Pulse: 90       Left Knee Exam     Muscle Strength   The patient has normal left knee strength.    Tenderness   The patient is experiencing tenderness in the lateral joint line.    Range of Motion   Extension:  0   Flexion:  120     Tests   Varus: negative Valgus: negative  Drawer:  Anterior - negative     Posterior - negative    Other   Sensation: normal  Swelling: none  Effusion: no effusion present          Observations   Left Knee   Negative for effusion.       Physical Exam  Vitals reviewed.   Constitutional:       Appearance: She is well-developed.   HENT:      Head: Normocephalic and atraumatic.   Eyes:      General:         Right eye: No discharge.         Left eye: No discharge.      Conjunctiva/sclera: Conjunctivae normal.   Cardiovascular:      Rate and Rhythm: Regular rhythm.   Pulmonary:      Effort: Pulmonary effort is normal. No respiratory distress.      Breath sounds: No stridor.   Musculoskeletal:      Cervical back: Normal range of motion and neck supple.      Left knee: No effusion.      Comments: As noted in the HPI.   Skin:     General: Skin is warm and dry.   Neurological:      Mental Status: She is alert and oriented to person, place, and time.   Psychiatric:         Behavior: Behavior normal.         I have personally reviewed pertinent films in PACS and my interpretation is as follows:  X-rays of the left knee taken in the ED on 10/17/2023 were reviewed.  There is evidence of severe lateral compartment osteoarthritis.  There is no evidence of acute fractured.      This document was created using speech voice recognition software.   Grammatical errors, random word insertions, pronoun errors, and incomplete sentences are an occasional consequence of this system due to software limitations, ambient noise, and hardware issues.   Any formal questions or concerns about content, text, or information contained within the body of this dictation should be directly addressed to the provider  for clarification.

## 2024-02-15 RX ORDER — DEXAMETHASONE SODIUM PHOSPHATE 10 MG/ML
40 INJECTION, SOLUTION INTRAMUSCULAR; INTRAVENOUS
Status: COMPLETED | OUTPATIENT
Start: 2024-02-14 | End: 2024-02-14

## 2024-02-15 RX ORDER — LIDOCAINE HYDROCHLORIDE 10 MG/ML
4 INJECTION, SOLUTION INFILTRATION; PERINEURAL
Status: COMPLETED | OUTPATIENT
Start: 2024-02-14 | End: 2024-02-14

## 2024-02-16 ENCOUNTER — APPOINTMENT (EMERGENCY)
Dept: RADIOLOGY | Facility: HOSPITAL | Age: 89
End: 2024-02-16
Payer: MEDICARE

## 2024-02-16 ENCOUNTER — HOSPITAL ENCOUNTER (EMERGENCY)
Facility: HOSPITAL | Age: 89
Discharge: HOME | End: 2024-02-16
Attending: EMERGENCY MEDICINE
Payer: MEDICARE

## 2024-02-16 VITALS
OXYGEN SATURATION: 94 % | DIASTOLIC BLOOD PRESSURE: 77 MMHG | RESPIRATION RATE: 20 BRPM | HEART RATE: 77 BPM | WEIGHT: 117 LBS | SYSTOLIC BLOOD PRESSURE: 142 MMHG | TEMPERATURE: 96.3 F | BODY MASS INDEX: 22.85 KG/M2

## 2024-02-16 DIAGNOSIS — K62.5 BRBPR (BRIGHT RED BLOOD PER RECTUM): ICD-10-CM

## 2024-02-16 DIAGNOSIS — K59.00 CONSTIPATION: Primary | ICD-10-CM

## 2024-02-16 DIAGNOSIS — R93.89 ABNORMAL CT SCAN: ICD-10-CM

## 2024-02-16 LAB
ALBUMIN SERPL BCP-MCNC: 4 G/DL (ref 3.5–5)
ALP SERPL-CCNC: 22 U/L (ref 34–104)
ALT SERPL W P-5'-P-CCNC: 28 U/L (ref 7–52)
ANION GAP SERPL CALCULATED.3IONS-SCNC: 7 MMOL/L
APTT PPP: 30 SECONDS (ref 23–37)
AST SERPL W P-5'-P-CCNC: 34 U/L (ref 13–39)
BASOPHILS # BLD AUTO: 0.05 THOUSANDS/ÂΜL (ref 0–0.1)
BASOPHILS NFR BLD AUTO: 1 % (ref 0–1)
BILIRUB SERPL-MCNC: 0.33 MG/DL (ref 0.2–1)
BUN SERPL-MCNC: 26 MG/DL (ref 5–25)
CALCIUM SERPL-MCNC: 9.9 MG/DL (ref 8.4–10.2)
CHLORIDE SERPL-SCNC: 106 MMOL/L (ref 96–108)
CO2 SERPL-SCNC: 30 MMOL/L (ref 21–32)
CREAT SERPL-MCNC: 0.88 MG/DL (ref 0.6–1.3)
EOSINOPHIL # BLD AUTO: 0.13 THOUSAND/ÂΜL (ref 0–0.61)
EOSINOPHIL NFR BLD AUTO: 2 % (ref 0–6)
ERYTHROCYTE [DISTWIDTH] IN BLOOD BY AUTOMATED COUNT: 14.2 % (ref 11.6–15.1)
GFR SERPL CREATININE-BSD FRML MDRD: 59 ML/MIN/1.73SQ M
GLUCOSE SERPL-MCNC: 94 MG/DL (ref 65–140)
HCT VFR BLD AUTO: 38.3 % (ref 34.8–46.1)
HGB BLD-MCNC: 12.8 G/DL (ref 11.5–15.4)
IMM GRANULOCYTES # BLD AUTO: 0.02 THOUSAND/UL (ref 0–0.2)
IMM GRANULOCYTES NFR BLD AUTO: 0 % (ref 0–2)
INR PPP: 1.09 (ref 0.84–1.19)
LYMPHOCYTES # BLD AUTO: 2.19 THOUSANDS/ÂΜL (ref 0.6–4.47)
LYMPHOCYTES NFR BLD AUTO: 26 % (ref 14–44)
MCH RBC QN AUTO: 31.6 PG (ref 26.8–34.3)
MCHC RBC AUTO-ENTMCNC: 33.4 G/DL (ref 31.4–37.4)
MCV RBC AUTO: 95 FL (ref 82–98)
MONOCYTES # BLD AUTO: 0.73 THOUSAND/ÂΜL (ref 0.17–1.22)
MONOCYTES NFR BLD AUTO: 9 % (ref 4–12)
NEUTROPHILS # BLD AUTO: 5.45 THOUSANDS/ÂΜL (ref 1.85–7.62)
NEUTS SEG NFR BLD AUTO: 62 % (ref 43–75)
NRBC BLD AUTO-RTO: 0 /100 WBCS
PLATELET # BLD AUTO: 247 THOUSANDS/UL (ref 149–390)
PMV BLD AUTO: 10.1 FL (ref 8.9–12.7)
POTASSIUM SERPL-SCNC: 3.7 MMOL/L (ref 3.5–5.3)
PROT SERPL-MCNC: 7 G/DL (ref 6.4–8.4)
PROTHROMBIN TIME: 14.3 SECONDS (ref 11.6–14.5)
RBC # BLD AUTO: 4.05 MILLION/UL (ref 3.81–5.12)
SODIUM SERPL-SCNC: 143 MMOL/L (ref 135–147)
WBC # BLD AUTO: 8.57 THOUSAND/UL (ref 4.31–10.16)

## 2024-02-16 PROCEDURE — 99285 EMERGENCY DEPT VISIT HI MDM: CPT | Performed by: EMERGENCY MEDICINE

## 2024-02-16 PROCEDURE — G1004 CDSM NDSC: HCPCS

## 2024-02-16 PROCEDURE — 85730 THROMBOPLASTIN TIME PARTIAL: CPT | Performed by: EMERGENCY MEDICINE

## 2024-02-16 PROCEDURE — 85610 PROTHROMBIN TIME: CPT | Performed by: EMERGENCY MEDICINE

## 2024-02-16 PROCEDURE — 99285 EMERGENCY DEPT VISIT HI MDM: CPT

## 2024-02-16 PROCEDURE — 36415 COLL VENOUS BLD VENIPUNCTURE: CPT | Performed by: EMERGENCY MEDICINE

## 2024-02-16 PROCEDURE — 80053 COMPREHEN METABOLIC PANEL: CPT | Performed by: EMERGENCY MEDICINE

## 2024-02-16 PROCEDURE — 85025 COMPLETE CBC W/AUTO DIFF WBC: CPT | Performed by: EMERGENCY MEDICINE

## 2024-02-16 PROCEDURE — 74177 CT ABD & PELVIS W/CONTRAST: CPT

## 2024-02-16 PROCEDURE — 82272 OCCULT BLD FECES 1-3 TESTS: CPT

## 2024-02-16 RX ADMIN — IOHEXOL 100 ML: 350 INJECTION, SOLUTION INTRAVENOUS at 18:52

## 2024-02-16 NOTE — ED PROVIDER NOTES
"History  Chief Complaint   Patient presents with    Rectal Bleeding     States she has had to push on her right buttock for past 2-3 days to have a BM. States today a large amount of blood came out. Denies abdominal or rectal pain . Patient is on Eliquis      Pt is an 87yo F who presents for rectal bleeding and constipation.  Patient reports she has history of intermittent constipation but over the past several days has noticed it to be worsened.  Patient reports that she has had to \"push on her buttock\" in order to move her bowels.  Patient states this has happened previously and she required surgical repair of her rectal and vaginal walls at that time.  Patient states no issues since that time.  Patient states that she has not noticed any blood in her stool but did notice blood while wiping today. She states that it was enough that the toilet paper was saturated. She states she had one BM since that time without any notable blood. Pt denies any abdominal or rectal pain. Pt states she has otherwise been feeling well. Pt reports that she is currently on Eliquis for AF and has been taking it regularly. Pt denies any recent changes to medications.         Prior to Admission Medications   Prescriptions Last Dose Informant Patient Reported? Taking?   Biotin 1 MG CAPS  Self Yes No   Sig: Use daily   Calcium Carbonate Antacid (CALCIUM CARBONATE PO)  Self Yes No   Sig: Take by mouth in the morning   Patient not taking: Reported on 1/24/2024   Calcium Polycarbophil (Fiber) 625 MG TABS  Self Yes No   Sig: Take 625 mg by mouth daily   Cholecalciferol (Vitamin D3) 1.25 MG (35284 UT) TABS  Self Yes No   Sig: Take by mouth   GNP CRANBERRY EXTRACT PO  Self Yes No   Sig: Take 600 mg by mouth   Multiple Vitamin (MULTIVITAMIN ADULT PO)  Self Yes No   Sig: Take 1 tablet by mouth daily   apixaban (ELIQUIS) 2.5 mg   No No   Sig: Take 1 tablet (2.5 mg total) by mouth 2 (two) times a day   fenofibrate 160 MG tablet  Self Yes No "   furosemide (LASIX) 20 mg tablet  Self Yes No   Sig: Take 20 mg by mouth in the morning   levocetirizine (XYZAL) 5 MG tablet  Self Yes No   Sig: Take 5 mg by mouth every evening   timolol (TIMOPTIC) 0.25 % ophthalmic solution  Self Yes No   Si (two) times a day      Facility-Administered Medications: None       Past Medical History:   Diagnosis Date    A-fib ([facility])     from prior records    Cerebral aneurysm, nonruptured 2024    COPD (chronic obstructive pulmonary disease) ([facility])     Heart disease     Hyperlipidemia     MR (mitral regurgitation)     Osteoarthritis     Pulmonary hypertension ([facility])     Stroke ([facility])     Sister       Past Surgical History:   Procedure Laterality Date    SHOULDER SURGERY  2016       Family History   Problem Relation Age of Onset    Stroke Sister      I have reviewed and agree with the history as documented.    E-Cigarette/Vaping    E-Cigarette Use Never User      E-Cigarette/Vaping Substances     Social History     Tobacco Use    Smoking status: Never    Smokeless tobacco: Never   Vaping Use    Vaping status: Never Used   Substance Use Topics    Alcohol use: Not Currently    Drug use: Never       Review of Systems   Gastrointestinal:  Positive for constipation and rectal pain.   All other systems reviewed and are negative.      Physical Exam  Physical Exam  Vitals reviewed.   Constitutional:       General: She is not in acute distress.     Appearance: She is well-developed. She is not toxic-appearing or diaphoretic.   HENT:      Head: Normocephalic and atraumatic.      Right Ear: External ear normal.      Left Ear: External ear normal.      Nose: Nose normal.      Mouth/Throat:      Pharynx: Oropharynx is clear.   Eyes:      Extraocular Movements: Extraocular movements intact.      Pupils: Pupils are equal, round, and reactive to light.   Cardiovascular:      Rate and Rhythm: Normal rate and regular rhythm.      Heart sounds: Normal heart sounds. No murmur  heard.  Pulmonary:      Effort: Pulmonary effort is normal. No respiratory distress.      Breath sounds: Normal breath sounds.   Abdominal:      General: There is no distension.      Palpations: Abdomen is soft.      Tenderness: There is no abdominal tenderness. There is no guarding or rebound.   Genitourinary:     Rectum: Normal. Guaiac result negative.   Musculoskeletal:         General: Normal range of motion.      Cervical back: Normal range of motion and neck supple.      Right lower leg: No edema.      Left lower leg: No edema.   Skin:     General: Skin is warm and dry.      Capillary Refill: Capillary refill takes less than 2 seconds.      Coloration: Skin is not pale.      Findings: No erythema or rash.   Neurological:      General: No focal deficit present.      Mental Status: She is alert and oriented to person, place, and time.   Psychiatric:         Speech: Speech normal.         Behavior: Behavior is cooperative.         Vital Signs  ED Triage Vitals [02/16/24 1630]   Temperature Pulse Respirations Blood Pressure SpO2   (!) 96.3 °F (35.7 °C) 88 20 122/73 97 %      Temp Source Heart Rate Source Patient Position - Orthostatic VS BP Location FiO2 (%)   Tympanic Monitor Sitting Left arm --      Pain Score       No Pain           Vitals:    02/16/24 1630 02/16/24 2000   BP: 122/73 142/77   Pulse: 88 77   Patient Position - Orthostatic VS: Sitting Lying         Visual Acuity      ED Medications  Medications   iohexol (OMNIPAQUE) 350 MG/ML injection (MULTI-DOSE) 100 mL (100 mL Intravenous Given 2/16/24 1852)       Diagnostic Studies  Results Reviewed       Procedure Component Value Units Date/Time    Comprehensive metabolic panel [[id number]]  (Abnormal) Collected: 02/16/24 1802    Lab Status: Final result Specimen: Blood from Arm, Left Updated: 02/16/24 1825     Sodium 143 mmol/L      Potassium 3.7 mmol/L      Chloride 106 mmol/L      CO2 30 mmol/L      ANION GAP 7 mmol/L      BUN 26 mg/dL      Creatinine 0.88  mg/dL      Glucose 94 mg/dL      Calcium 9.9 mg/dL      AST 34 U/L      ALT 28 U/L      Alkaline Phosphatase 22 U/L      Total Protein 7.0 g/dL      Albumin 4.0 g/dL      Total Bilirubin 0.33 mg/dL      eGFR 59 ml/min/1.73sq m     Narrative:      National Kidney Disease Foundation guidelines for Chronic Kidney Disease (CKD):     Stage 1 with normal or high GFR (GFR > 90 mL/min/1.73 square meters)    Stage 2 Mild CKD (GFR = 60-89 mL/min/1.73 square meters)    Stage 3A Moderate CKD (GFR = 45-59 mL/min/1.73 square meters)    Stage 3B Moderate CKD (GFR = 30-44 mL/min/1.73 square meters)    Stage 4 Severe CKD (GFR = 15-29 mL/min/1.73 square meters)    Stage 5 End Stage CKD (GFR <15 mL/min/1.73 square meters)  Note: GFR calculation is accurate only with a steady state creatinine    Protime-INR [[id number]]  (Normal) Collected: 02/16/24 1802    Lab Status: Final result Specimen: Blood from Arm, Left Updated: 02/16/24 1821     Protime 14.3 seconds      INR 1.09    APTT [[id number]]  (Normal) Collected: 02/16/24 1802    Lab Status: Final result Specimen: Blood from Arm, Left Updated: 02/16/24 1821     PTT 30 seconds     CBC and differential [[id number]] Collected: 02/16/24 1802    Lab Status: Final result Specimen: Blood from Arm, Left Updated: 02/16/24 1809     WBC 8.57 Thousand/uL      RBC 4.05 Million/uL      Hemoglobin 12.8 g/dL      Hematocrit 38.3 %      MCV 95 fL      MCH 31.6 pg      MCHC 33.4 g/dL      RDW 14.2 %      MPV 10.1 fL      Platelets 247 Thousands/uL      nRBC 0 /100 WBCs      Neutrophils Relative 62 %      Immat GRANS % 0 %      Lymphocytes Relative 26 %      Monocytes Relative 9 %      Eosinophils Relative 2 %      Basophils Relative 1 %      Neutrophils Absolute 5.45 Thousands/µL      Immature Grans Absolute 0.02 Thousand/uL      Lymphocytes Absolute 2.19 Thousands/µL      Monocytes Absolute 0.73 Thousand/µL      Eosinophils Absolute 0.13 Thousand/µL      Basophils Absolute 0.05 Thousands/µL                     CT abdomen pelvis with contrast   Final Result by Javier Jordan MD (02/16 2029)      1.  1.9 cm solid enhancing mass within the lower pole of the left kidney concerning for renal cell carcinoma.   2.  1.2 cm nodule in the left lower lobe (series 5, image 8). Correlate with prior imaging, metastasis cannot be excluded.   3.  Moderate hiatal hernia. Colonic diverticulosis without evidence of diverticulitis.      I personally discussed this study with HILL YU on 2/16/2024 8:29 PM.            Workstation performed: [id number]                    Procedures  Procedures         ED Course  ED Course as of 02/16/24 2318   Fri Feb 16, 2024   1750 Hemoccult negative   1810 Hemoglobin: 12.8  Stable from most recent prior of 12.7.   1810 CBC and differential  Reviewed and without actionable derangement.    1821 PTT: 30  WNL   1821 POCT INR: 1.09  WNL   1828 Comprehensive metabolic panel(!)  Reviewed and without actionable derangement.    1931 Awaiting CT read.    2020 CT being read.    2031 CT abdomen pelvis with contrast  1.  1.9 cm solid enhancing mass within the lower pole of the left kidney concerning for renal cell carcinoma.  2.  1.2 cm nodule in the left lower lobe (series 5, image 8). Correlate with prior imaging, metastasis cannot be excluded.  3.  Moderate hiatal hernia. Colonic diverticulosis without evidence of diverticulitis.   2037 Pt reassessed and resting comfortably. Pt made aware of all results including incidental CT findings. Joint decision making used and pt agreeable to DC with strict return precautions and outpt follow-up. Pt with stable VS and hemoccult negative. Stable for DC at this time.                                SBIRT 20yo+      Flowsheet Row Most Recent Value   Initial Alcohol Screen: US AUDIT-C     1. How often do you have a drink containing alcohol? 0 Filed at: 02/16/2024 1632   Audit-C Score 0 Filed at: 02/16/2024 1632   SHAHAB: How many times in the past year have you...     Used an illegal drug or used a prescription medication for non-medical reasons? Never Filed at: 02/16/2024 1632                      Medical Decision Making  Pt is a 89yo F who presents with constipation and rectal bleeding.     Differential diagnosis to include but not limited to GIB, fissure, hemorrhoid, obstruction, constipation, ABLA.  Will plan for labs and imaging. See ED course for results and details.    Plan to discharge pt with f/u to PCP and GI. Discussed returning the ED with new or worsening of symptoms. Pt expressed understanding of discharge instructions and return precautions and is stable for discharge at this time. All questions were answered and pt was discharged without incident.       Amount and/or Complexity of Data Reviewed  Labs: ordered. Decision-making details documented in ED Course.  Radiology: ordered. Decision-making details documented in ED Course.    Risk  Prescription drug management.             Disposition  Final diagnoses:   Constipation   BRBPR (bright red blood per rectum)   Abnormal CT scan     Time reflects when diagnosis was documented in both MDM as applicable and the Disposition within this note       Time User Action Codes Description Comment    2/16/2024  8:40 PM Allie Cavazos Add [K59.00] Constipation     2/16/2024  8:40 PM Allie Cavazos Add [K62.5] BRBPR (bright red blood per rectum)     2/16/2024  8:40 PM Allie Cavazos Add [R93.89] Abnormal CT scan           ED Disposition       ED Disposition   Discharge    Condition   Stable    Date/Time   Fri Feb 16, 2024 2040    Comment   Alea Lovelace discharge to home/self care.                   Follow-up Information       Follow up With Specialties Details Why Contact Info Additional Information    GONZALEZ Cassidy Nurse Practitioner Call  As needed [address] [address] [phone]       [facility] Call on 2/19/2024  [facility]  [address] [address] [phone] [facility] [place], [address], [place] [address], [place], [place], [id number] [phone]            Discharge Medication List as of 2/16/2024  8:41 PM        CONTINUE these medications which have NOT CHANGED    Details   apixaban (ELIQUIS) 2.5 mg Take 1 tablet (2.5 mg total) by mouth 2 (two) times a day, Starting Wed 11/1/2023, Normal      Biotin 1 MG CAPS Use daily, Historical Med      Calcium Carbonate Antacid (CALCIUM CARBONATE PO) Take by mouth in the morning, Historical Med      Calcium Polycarbophil (Fiber) 625 MG TABS Take 625 mg by mouth daily, Historical Med      Cholecalciferol (Vitamin D3) 1.25 MG (55600 UT) TABS Take by mouth, Historical Med      fenofibrate 160 MG tablet Starting Wed 11/9/2022, Historical Med      furosemide (LASIX) 20 mg tablet Take 20 mg by mouth in the morning, Historical Med      GNP CRANBERRY EXTRACT PO Take 600 mg by mouth, Historical Med      levocetirizine (XYZAL) 5 MG tablet Take 5 mg by mouth every evening, Historical Med      Multiple Vitamin (MULTIVITAMIN ADULT PO) Take 1 tablet by mouth daily, Historical Med      timolol (TIMOPTIC) 0.25 % ophthalmic solution 2 (two) times a day, Historical Med             No discharge procedures on file.    PDMP Review       None            ED Provider  Electronically Signed by             Allie Cavazos MD  02/16/24 4213

## 2024-02-17 NOTE — DISCHARGE INSTRUCTIONS
Follow-up with primary care and GI for further care. Contact info provided below if needed.  Return to the ED with new or worsening symptoms.

## 2024-02-22 ENCOUNTER — OFFICE VISIT (OUTPATIENT)
Dept: FAMILY MEDICINE CLINIC | Facility: CLINIC | Age: 89
End: 2024-02-22
Payer: MEDICARE

## 2024-02-22 VITALS
HEART RATE: 80 BPM | TEMPERATURE: 97.6 F | RESPIRATION RATE: 16 BRPM | DIASTOLIC BLOOD PRESSURE: 70 MMHG | BODY MASS INDEX: 24.77 KG/M2 | WEIGHT: 118 LBS | HEIGHT: 58 IN | OXYGEN SATURATION: 100 % | SYSTOLIC BLOOD PRESSURE: 120 MMHG

## 2024-02-22 DIAGNOSIS — I27.20 PULMONARY HYPERTENSION (HCC): ICD-10-CM

## 2024-02-22 DIAGNOSIS — N28.89 LEFT KIDNEY MASS: ICD-10-CM

## 2024-02-22 DIAGNOSIS — R91.1 NODULE OF LOWER LOBE OF LEFT LUNG: Primary | ICD-10-CM

## 2024-02-22 DIAGNOSIS — Z76.89 ENCOUNTER TO ESTABLISH CARE: ICD-10-CM

## 2024-02-22 DIAGNOSIS — I48.0 PAROXYSMAL ATRIAL FIBRILLATION (HCC): ICD-10-CM

## 2024-02-22 DIAGNOSIS — J44.9 CHRONIC OBSTRUCTIVE PULMONARY DISEASE, UNSPECIFIED COPD TYPE (HCC): ICD-10-CM

## 2024-02-22 PROBLEM — M19.90 OSTEOARTHRITIS: Status: ACTIVE | Noted: 2024-02-22

## 2024-02-22 PROBLEM — H40.9 GLAUCOMA: Status: ACTIVE | Noted: 2024-02-22

## 2024-02-22 PROCEDURE — 99204 OFFICE O/P NEW MOD 45 MIN: CPT | Performed by: NURSE PRACTITIONER

## 2024-02-22 RX ORDER — FUROSEMIDE 20 MG/1
20 TABLET ORAL DAILY
Status: CANCELLED | OUTPATIENT
Start: 2024-02-22

## 2024-02-22 RX ORDER — FENOFIBRATE 160 MG/1
TABLET ORAL
Status: CANCELLED | OUTPATIENT
Start: 2024-02-22

## 2024-02-22 NOTE — PROGRESS NOTES
Name: Alea Lovelace      : 1935      MRN: [id number]  Encounter Provider: MARK Iyer  Encounter Date: 2024   Encounter department: [facility]    Assessment & Plan   1. Pulmonary hypertension (HCC)  Stable. Managed by cardio. monitor    2. Chronic obstructive pulmonary disease, unspecified COPD type (HCC)  No meds. No recent exad. Monitor.     3. Paroxysmal atrial fibrillation (HCC)  Stable on eliquis. Rate controlled. Monitor. Managed by cardio    4. Nodule of lower lobe of left lung  LLL nodule. Pt refuses further eval/tx.     5. Left kidney mass  Solid mass left kidney noted on CT scan. Pt aware. Refuses further eval/tx.     6. Encounter to establish care  Heart healthy, carbohydrate controlled diet- limit red meat, limit saturated fat, moderate salt intake, limit junk food, etc.   Regular exercise  Stress management  Routine labwork and screenings as ordered.       Patient was counseled regarding instructions for management which included: impression/diagnosis, risk/benefits of treatment options, importance of compliance with treatment, risk factor reduction, and prognosis.   I have reviewed the instructions with the patient answering all questions and patient verbalized understanding.           Subjective      Here to establish care  Recent ED visit for blood in stool   Resolved. Labs were normal .thinks was hemorrhoid.   Was advised of findings on CT scan -lung nodule and kidney mass. States will not have any additional testing. Does not want to see any specialists.   Does not plan to do anything about findings.   Follows with urology, Dr. Al. Chronic cystitis  Follows with cardio for afib and pulmonary htn, sees every 6 months. and podiatry.   On eliquis for afib.   COPD dx in chart but pt denies any tx for same. No meds.   SH: lives alone. Retired . Moved back to area 2 years ago . Had been living in [place]. Non smoker  Current  issues include: dry mouth at night. Runs humidifier.   Left knee pain, bone on bone, got cortisone injection, Dr Torres.           Review of Systems   Constitutional:  Negative for fatigue and unexpected weight change.   HENT:  Positive for congestion and rhinorrhea.         Dry mouth, mostly at night.    Eyes:         Wears glasses   Respiratory:  Negative for cough and shortness of breath.    Cardiovascular:  Negative for chest pain and palpitations.   Gastrointestinal:  Positive for blood in stool (recently, thinks may have hemorrhoid). Negative for abdominal distention, abdominal pain, constipation, diarrhea, nausea and vomiting.   Endocrine: Negative for polydipsia and polyuria.   Genitourinary:  Negative for dysuria and frequency.   Musculoskeletal:  Positive for arthralgias (knees, feet).   Skin:  Negative for rash and wound.   Neurological:  Negative for dizziness, seizures and headaches.   Hematological:  Does not bruise/bleed easily.   Psychiatric/Behavioral:  Negative for dysphoric mood. The patient is not nervous/anxious.        Current Outpatient Medications on File Prior to Visit   Medication Sig    apixaban (ELIQUIS) 2.5 mg Take 1 tablet (2.5 mg total) by mouth 2 (two) times a day    Biotin 1 MG CAPS Use daily    Cholecalciferol (Vitamin D3) 1.25 MG (79065 UT) TABS Take by mouth    fenofibrate 160 MG tablet     furosemide (LASIX) 20 mg tablet Take 20 mg by mouth in the morning    GNP CRANBERRY EXTRACT PO Take 600 mg by mouth    levocetirizine (XYZAL) 5 MG tablet Take 5 mg by mouth every evening    Multiple Vitamin (MULTIVITAMIN ADULT PO) Take 1 tablet by mouth daily    timolol (TIMOPTIC) 0.25 % ophthalmic solution 2 (two) times a day    [DISCONTINUED] Calcium Carbonate Antacid (CALCIUM CARBONATE PO) Take by mouth in the morning (Patient not taking: Reported on 1/24/2024)    [DISCONTINUED] Calcium Polycarbophil (Fiber) 625 MG TABS Take 625 mg by mouth daily       Objective   ED records  "2/16/2024  History       Chief Complaint   Patient presents with    Rectal Bleeding       States she has had to push on her right buttock for past 2-3 days to have a BM. States today a large amount of blood came out. Denies abdominal or rectal pain . Patient is on Eliquis       Pt is an 87yo F who presents for rectal bleeding and constipation.  Patient reports she has history of intermittent constipation but over the past several days has noticed it to be worsened.  Patient reports that she has had to \"push on her buttock\" in order to move her bowels.  Patient states this has happened previously and she required surgical repair of her rectal and vaginal walls at that time.  Patient states no issues since that time.  Patient states that she has not noticed any blood in her stool but did notice blood while wiping today. She states that it was enough that the toilet paper was saturated. She states she had one BM since that time without any notable blood. Pt denies any abdominal or rectal pain. Pt states she has otherwise been feeling well. Pt reports that she is currently on Eliquis for AF and has been taking it regularly. Pt denies any recent changes to medications.      Recent Results (from the past 672 hour(s))   CBC and differential    Collection Time: 02/16/24  6:02 PM   Result Value Ref Range    WBC 8.57 4.31 - 10.16 Thousand/uL    RBC 4.05 3.81 - 5.12 Million/uL    Hemoglobin 12.8 11.5 - 15.4 g/dL    Hematocrit 38.3 34.8 - 46.1 %    MCV 95 82 - 98 fL    MCH 31.6 26.8 - 34.3 pg    MCHC 33.4 31.4 - 37.4 g/dL    RDW 14.2 11.6 - 15.1 %    MPV 10.1 8.9 - 12.7 fL    Platelets 247 149 - 390 Thousands/uL    nRBC 0 /100 WBCs    Neutrophils Relative 62 43 - 75 %    Immat GRANS % 0 0 - 2 %    Lymphocytes Relative 26 14 - 44 %    Monocytes Relative 9 4 - 12 %    Eosinophils Relative 2 0 - 6 %    Basophils Relative 1 0 - 1 %    Neutrophils Absolute 5.45 1.85 - 7.62 Thousands/µL    Immature Grans Absolute 0.02 0.00 - 0.20 " "Thousand/uL    Lymphocytes Absolute 2.19 0.60 - 4.47 Thousands/µL    Monocytes Absolute 0.73 0.17 - 1.22 Thousand/µL    Eosinophils Absolute 0.13 0.00 - 0.61 Thousand/µL    Basophils Absolute 0.05 0.00 - 0.10 Thousands/µL   Comprehensive metabolic panel    Collection Time: 02/16/24  6:02 PM   Result Value Ref Range    Sodium 143 135 - 147 mmol/L    Potassium 3.7 3.5 - 5.3 mmol/L    Chloride 106 96 - 108 mmol/L    CO2 30 21 - 32 mmol/L    ANION GAP 7 mmol/L    BUN 26 (H) 5 - 25 mg/dL    Creatinine 0.88 0.60 - 1.30 mg/dL    Glucose 94 65 - 140 mg/dL    Calcium 9.9 8.4 - 10.2 mg/dL    AST 34 13 - 39 U/L    ALT 28 7 - 52 U/L    Alkaline Phosphatase 22 (L) 34 - 104 U/L    Total Protein 7.0 6.4 - 8.4 g/dL    Albumin 4.0 3.5 - 5.0 g/dL    Total Bilirubin 0.33 0.20 - 1.00 mg/dL    eGFR 59 ml/min/1.73sq m   Protime-INR    Collection Time: 02/16/24  6:02 PM   Result Value Ref Range    Protime 14.3 11.6 - 14.5 seconds    INR 1.09 0.84 - 1.19   APTT    Collection Time: 02/16/24  6:02 PM   Result Value Ref Range    PTT 30 23 - 37 seconds   CT abd 2/16/24  IMPRESSION:     1.  1.9 cm solid enhancing mass within the lower pole of the left kidney concerning for renal cell carcinoma.  2.  1.2 cm nodule in the left lower lobe (series 5, image 8). Correlate with prior imaging, metastasis cannot be excluded.  3.  Moderate hiatal hernia. Colonic diverticulosis without evidence of diverticulitis.  Reviewed lab/diagnostic results with pt including both normal and abnormal findings.   In depth counseling and instructions given. All questions answered during visit.     /70 (BP Location: Left arm, Patient Position: Sitting, Cuff Size: Standard)   Pulse 80   Temp 97.6 °F (36.4 °C)   Resp 16   Ht 4' 10\" (1.473 m)   Wt 53.5 kg (118 lb)   SpO2 100%   BMI 24.66 kg/m²     Physical Exam  Vitals reviewed.   Constitutional:       General: She is not in acute distress.     Appearance: She is not ill-appearing.   Neck:      Vascular: No " carotid bruit.   Cardiovascular:      Rate and Rhythm: Normal rate and regular rhythm.      Heart sounds: Murmur heard.   Pulmonary:      Effort: Pulmonary effort is normal. No respiratory distress.      Breath sounds: Rales (coarse at bases b/l) present.   Musculoskeletal:      Right lower leg: No edema.      Left lower leg: No edema.   Skin:     General: Skin is warm and dry.      Coloration: Skin is not jaundiced or pale.   Neurological:      General: No focal deficit present.      Mental Status: She is alert and oriented to person, place, and time.      Sensory: Sensory deficit ([place], uses hearing aides) present.   Psychiatric:         Mood and Affect: Mood normal.         Behavior: Behavior normal.         Thought Content: Thought content normal.         Judgment: Judgment normal.       MARK Iyer

## 2024-02-23 ENCOUNTER — TELEPHONE (OUTPATIENT)
Dept: ADMINISTRATIVE | Facility: OTHER | Age: 89
End: 2024-02-23

## 2024-02-23 DIAGNOSIS — I27.20 PULMONARY HYPERTENSION (HCC): ICD-10-CM

## 2024-02-23 DIAGNOSIS — E78.1 HYPERTRIGLYCERIDEMIA: Primary | ICD-10-CM

## 2024-02-23 RX ORDER — FUROSEMIDE 20 MG/1
20 TABLET ORAL DAILY
Qty: 90 TABLET | Refills: 1 | Status: SHIPPED | OUTPATIENT
Start: 2024-02-23

## 2024-02-23 RX ORDER — FENOFIBRATE 160 MG/1
160 TABLET ORAL DAILY
Qty: 90 TABLET | Refills: 1 | Status: SHIPPED | OUTPATIENT
Start: 2024-02-23

## 2024-02-23 NOTE — TELEPHONE ENCOUNTER
Upon review of the In Basket request we were able to locate, review, and update the patient chart as requested for Medicare AW.    Any additional questions or concerns should be emailed to the Practice Liaisons via the appropriate education email address, please do not reply via In Basket.    Thank you  Emelia Mccormack

## 2024-02-23 NOTE — TELEPHONE ENCOUNTER
----- Message from Hannah Herrera sent at 2/22/2024  1:54 PM EST -----  Regarding: AWV  02/22/24 1:54 PM    Hello, our patient attached above has had Medicare AWV completed/performed. Please assist in updating the patient chart by pulling the document from Encouters Tab within Chart Review. The date of service is 08/2023.     Thank you,  Hannah LIMA

## 2024-02-29 ENCOUNTER — TELEPHONE (OUTPATIENT)
Age: 89
End: 2024-02-29

## 2024-02-29 DIAGNOSIS — M17.12 PRIMARY OSTEOARTHRITIS OF LEFT KNEE: Primary | ICD-10-CM

## 2024-02-29 NOTE — TELEPHONE ENCOUNTER
Per last OVN, Visco would be next step, order placed and pt advised auth team will reach out once approved.

## 2024-02-29 NOTE — TELEPHONE ENCOUNTER
Caller: Patient    Doctor: Brian    Reason for call: Patient stated 2/14 L knee injection has not helped and she would like to know what her next steps for pain relief should be.    Please advise.    Call back#: [phone]

## 2024-03-06 ENCOUNTER — PROCEDURE VISIT (OUTPATIENT)
Dept: OBGYN CLINIC | Facility: CLINIC | Age: 89
End: 2024-03-06
Payer: MEDICARE

## 2024-03-06 DIAGNOSIS — M17.12 PRIMARY OSTEOARTHRITIS OF LEFT KNEE: Primary | ICD-10-CM

## 2024-03-06 PROCEDURE — 20610 DRAIN/INJ JOINT/BURSA W/O US: CPT | Performed by: ORTHOPAEDIC SURGERY

## 2024-03-06 RX ORDER — HYALURONATE SODIUM 10 MG/ML
20 SYRINGE (ML) INTRAARTICULAR
Status: COMPLETED | OUTPATIENT
Start: 2024-03-06 | End: 2024-03-06

## 2024-03-06 RX ADMIN — Medication 20 MG: at 10:15

## 2024-03-06 NOTE — PROGRESS NOTES
Assessment/Plan:  1. Primary osteoarthritis of left knee  Large joint arthrocentesis: L knee        Scribe Attestation      I,:  Mylesdannielle Saravia am acting as a scribe while in the presence of the attending physician.:       I,:  Phuc Torres, DO personally performed the services described in this documentation    as scribed in my presence.:               Fariba tolerated Euflexxa injection number 1 for the left knee.  She will follow up in one week for injection number 2.    Subjective:   Alea Lovelace is a 88 y.o. female who presents for Euflexxa injection #1 left knee.      Review of Systems      Past Medical History:   Diagnosis Date    A-fib (HCC)     from prior records    Cerebral aneurysm, nonruptured 01/23/2024    COPD (chronic obstructive pulmonary disease) (HCC)     Heart disease 2016    Hyperlipidemia     MR (mitral regurgitation)     Osteoarthritis 2020    Pulmonary hypertension (HCC)     Stroke (HCC)     Sister       Past Surgical History:   Procedure Laterality Date    SHOULDER SURGERY  2016       Family History   Problem Relation Age of Onset    Stroke Sister        Social History     Occupational History    Not on file   Tobacco Use    Smoking status: Never    Smokeless tobacco: Never   Vaping Use    Vaping status: Never Used   Substance and Sexual Activity    Alcohol use: Not Currently    Drug use: Never    Sexual activity: Not Currently     Partners: Male     Birth control/protection: Female Sterilization         Current Outpatient Medications:     apixaban (ELIQUIS) 2.5 mg, Take 1 tablet (2.5 mg total) by mouth 2 (two) times a day, Disp: 180 tablet, Rfl: 3    Biotin 1 MG CAPS, Use daily, Disp: , Rfl:     Cholecalciferol (Vitamin D3) 1.25 MG (80142 UT) TABS, Take by mouth, Disp: , Rfl:     fenofibrate 160 MG tablet, Take 1 tablet (160 mg total) by mouth daily, Disp: 90 tablet, Rfl: 1    furosemide (LASIX) 20 mg tablet, Take 1 tablet (20 mg total) by mouth in the morning, Disp: 90 tablet, Rfl: 1     GNP CRANBERRY EXTRACT PO, Take 600 mg by mouth, Disp: , Rfl:     levocetirizine (XYZAL) 5 MG tablet, Take 5 mg by mouth every evening, Disp: , Rfl:     Multiple Vitamin (MULTIVITAMIN ADULT PO), Take 1 tablet by mouth daily, Disp: , Rfl:     timolol (TIMOPTIC) 0.25 % ophthalmic solution, 2 (two) times a day, Disp: , Rfl:     Allergies   Allergen Reactions    Nitrofurantoin Other (See Comments)    Tramadol Other (See Comments)       Objective:  There were no vitals filed for this visit.    Ortho Exam    Physical Exam    Large joint arthrocentesis: L knee  Universal Protocol:  Consent: Verbal consent obtained.  Risks and benefits: risks, benefits and alternatives were discussed  Consent given by: patient  Timeout called at: 3/6/2024 10:36 AM.  Patient understanding: patient states understanding of the procedure being performed  Patient consent: the patient's understanding of the procedure matches consent given  Patient identity confirmed: verbally with patient  Supporting Documentation  Indications: pain   Procedure Details  Location: knee - L knee  Preparation: Patient was prepped and draped in the usual sterile fashion  Needle size: 22 G  Ultrasound guidance: no  Approach: anterolateral  Medications administered: 20 mg Sodium Hyaluronate (Viscosup) 20 MG/2ML    Patient tolerance: patient tolerated the procedure well with no immediate complications  Dressing:  Sterile dressing applied              This document was created using speech voice recognition software.   Grammatical errors, random word insertions, pronoun errors, and incomplete sentences are an occasional consequence of this system due to software limitations, ambient noise, and hardware issues.   Any formal questions or concerns about content, text, or information contained within the body of this dictation should be directly addressed to the provider for clarification.

## 2024-03-13 ENCOUNTER — PROCEDURE VISIT (OUTPATIENT)
Dept: OBGYN CLINIC | Facility: CLINIC | Age: 89
End: 2024-03-13
Payer: MEDICARE

## 2024-03-13 DIAGNOSIS — M17.12 PRIMARY OSTEOARTHRITIS OF LEFT KNEE: Primary | ICD-10-CM

## 2024-03-13 PROCEDURE — 20610 DRAIN/INJ JOINT/BURSA W/O US: CPT | Performed by: ORTHOPAEDIC SURGERY

## 2024-03-13 RX ORDER — HYALURONATE SODIUM 10 MG/ML
20 SYRINGE (ML) INTRAARTICULAR
Status: COMPLETED | OUTPATIENT
Start: 2024-03-13 | End: 2024-03-13

## 2024-03-13 RX ADMIN — Medication 20 MG: at 13:15

## 2024-03-13 NOTE — PROGRESS NOTES
Assessment/Plan:  1. Primary osteoarthritis of left knee  Large joint arthrocentesis: L knee          Nataly tolerated her second Euflexxa injection of the left knee today.  Follow-up in 1 week for the next injection.    Subjective:   Alea Lovelace is a 88 y.o. female who presents to the office for her second Euflexxa injection of the left knee         Past Medical History:   Diagnosis Date    A-fib (HCC)     from prior records    Cerebral aneurysm, nonruptured 01/23/2024    COPD (chronic obstructive pulmonary disease) (HCC)     Heart disease 2016    Hyperlipidemia     MR (mitral regurgitation)     Osteoarthritis 2020    Pulmonary hypertension (HCC)     Stroke (HCC)     Sister       Past Surgical History:   Procedure Laterality Date    SHOULDER SURGERY  2016       Family History   Problem Relation Age of Onset    Stroke Sister        Social History     Occupational History    Not on file   Tobacco Use    Smoking status: Never    Smokeless tobacco: Never   Vaping Use    Vaping status: Never Used   Substance and Sexual Activity    Alcohol use: Not Currently    Drug use: Never    Sexual activity: Not Currently     Partners: Male     Birth control/protection: Female Sterilization         Current Outpatient Medications:     apixaban (ELIQUIS) 2.5 mg, Take 1 tablet (2.5 mg total) by mouth 2 (two) times a day, Disp: 180 tablet, Rfl: 3    Biotin 1 MG CAPS, Use daily, Disp: , Rfl:     Cholecalciferol (Vitamin D3) 1.25 MG (62459 UT) TABS, Take by mouth, Disp: , Rfl:     fenofibrate 160 MG tablet, Take 1 tablet (160 mg total) by mouth daily, Disp: 90 tablet, Rfl: 1    furosemide (LASIX) 20 mg tablet, Take 1 tablet (20 mg total) by mouth in the morning, Disp: 90 tablet, Rfl: 1    GNP CRANBERRY EXTRACT PO, Take 600 mg by mouth, Disp: , Rfl:     levocetirizine (XYZAL) 5 MG tablet, Take 5 mg by mouth every evening, Disp: , Rfl:     Multiple Vitamin (MULTIVITAMIN ADULT PO), Take 1 tablet by mouth daily, Disp: , Rfl:      timolol (TIMOPTIC) 0.25 % ophthalmic solution, 2 (two) times a day, Disp: , Rfl:     Allergies   Allergen Reactions    Nitrofurantoin Other (See Comments)    Tramadol Other (See Comments)       Objective:  There were no vitals filed for this visit.         Ortho Exam    Physical Exam  Vitals and nursing note reviewed.   Constitutional:       Appearance: Normal appearance. She is well-developed.   HENT:      Head: Normocephalic and atraumatic.      Right Ear: External ear normal.      Left Ear: External ear normal.   Eyes:      General: No scleral icterus.     Extraocular Movements: Extraocular movements intact.      Conjunctiva/sclera: Conjunctivae normal.   Cardiovascular:      Rate and Rhythm: Normal rate.   Pulmonary:      Effort: Pulmonary effort is normal. No respiratory distress.   Musculoskeletal:      Cervical back: Normal range of motion and neck supple.      Comments: See Ortho exam   Skin:     General: Skin is warm and dry.   Neurological:      General: No focal deficit present.      Mental Status: She is alert and oriented to person, place, and time.   Psychiatric:         Behavior: Behavior normal.         Large joint arthrocentesis: L knee  Universal Protocol:  Consent: Verbal consent obtained.  Risks and benefits: risks, benefits and alternatives were discussed  Consent given by: patient  Site marked: the operative site was marked  Supporting Documentation  Indications: pain   Procedure Details  Location: knee - L knee  Needle size: 22 G  Ultrasound guidance: no  Approach: anterolateral  Medications administered: 20 mg Sodium Hyaluronate (Viscosup) 20 MG/2ML  Specialty Pharmacy Supplied: received medications from pharmacy  Patient tolerance: patient tolerated the procedure well with no immediate complications  Dressing:  Sterile dressing applied            This document was created using speech voice recognition software.   Grammatical errors, random word insertions, pronoun errors, and incomplete  sentences are an occasional consequence of this system due to software limitations, ambient noise, and hardware issues.   Any formal questions or concerns about content, text, or information contained within the body of this dictation should be directly addressed to the provider for clarification.

## 2024-03-14 ENCOUNTER — OFFICE VISIT (OUTPATIENT)
Dept: CARDIOLOGY CLINIC | Facility: CLINIC | Age: 89
End: 2024-03-14
Payer: MEDICARE

## 2024-03-14 VITALS
SYSTOLIC BLOOD PRESSURE: 110 MMHG | BODY MASS INDEX: 24.14 KG/M2 | OXYGEN SATURATION: 98 % | HEIGHT: 58 IN | WEIGHT: 115 LBS | DIASTOLIC BLOOD PRESSURE: 70 MMHG | HEART RATE: 84 BPM

## 2024-03-14 DIAGNOSIS — I27.20 PULMONARY HYPERTENSION (HCC): ICD-10-CM

## 2024-03-14 DIAGNOSIS — I11.9 HYPERTENSIVE HEART DISEASE WITHOUT HEART FAILURE: ICD-10-CM

## 2024-03-14 DIAGNOSIS — N28.89 LEFT KIDNEY MASS: ICD-10-CM

## 2024-03-14 DIAGNOSIS — I48.0 PAROXYSMAL ATRIAL FIBRILLATION (HCC): ICD-10-CM

## 2024-03-14 DIAGNOSIS — E78.5 DYSLIPIDEMIA: ICD-10-CM

## 2024-03-14 DIAGNOSIS — R01.1 CARDIAC MURMUR: ICD-10-CM

## 2024-03-14 DIAGNOSIS — J44.9 CHRONIC OBSTRUCTIVE PULMONARY DISEASE, UNSPECIFIED COPD TYPE (HCC): ICD-10-CM

## 2024-03-14 PROCEDURE — 99214 OFFICE O/P EST MOD 30 MIN: CPT | Performed by: INTERNAL MEDICINE

## 2024-03-14 PROCEDURE — 93000 ELECTROCARDIOGRAM COMPLETE: CPT | Performed by: INTERNAL MEDICINE

## 2024-03-14 RX ORDER — METOPROLOL SUCCINATE 25 MG/1
25 TABLET, EXTENDED RELEASE ORAL EVERY OTHER DAY
Qty: 90 TABLET | Refills: 1 | Status: SHIPPED | OUTPATIENT
Start: 2024-03-14

## 2024-03-14 NOTE — PROGRESS NOTES
Progress note- Cardiology Office  [facility].    Alea Lovelace 88 y.o. female MRN: [id number]  : 1935  Unit/Bed#:  Encounter: [id number]      Assessment:     1. Paroxysmal atrial fibrillation (HCC)    2. Hypertensive heart disease without heart failure    3. Cardiac murmur    4. Pulmonary hypertension (HCC)    5. Chronic obstructive pulmonary disease, unspecified COPD type (HCC)    6. Dyslipidemia    7. Left kidney mass        Discussion summary and Plan:       1. Paroxysmal atrial fibrillation.  She is maintaining sinus rhythm.  As per previous cardiac there is 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 not on any AV node blocking drugs her heart rate is 84 bpm.  Will add Toprol-XL 25 mg every other day.     2. Hypertensive heart disease without heart failure.  She has takes diuretics blood pressure has been acceptable.  Same medication.  She is on Lasix 20 mg daily and will add Toprol-XL 25 mg every other day.     3. Dyslipidemia.  She has been on fenofibrate.  She is not tolerating very well.  Cardiology note reviewed.   She could not tolerate other statins.  She is doing well on it.  And considering her age I will continue same Rx.  No change in issues.      4. Cardiac murmur.  She does have history of cardiac murmur echo Doppler in May 2021 shows mild-to-moderate MR mild TR EF is acceptable.  Will check echo Doppler last echo was in .      5. History of COPD and bronchiectasis.  Currently stable patient never smoked her  used to smoke and she worked in a restaurant.  She is not having any cough at this time.     6. Mild pulmonary hypertension and repeat echo Doppler will be ordered.     7. ALESSANDRO walks with the help of cane currently getting injections in her feet doing well.    8.  Abnormal CT of the abdominal with kidney mass.  Patient is aware she does not want any further workup.  Advised her to have repeat CAT scan aches if growing fastly maybe she should  consider it removal.  She will have follow-up appointment with her primary care team.    Continue current Rx.  We will continue to follow.      Patient  was advised and educated to call our office  immediately if  patient has any new symptoms of chest pain/shortness of breath, near-syncope, syncope, light headedness sustained palpitations  or any other cardiovascular symptoms before their scheduled follow-up appointment.  Office #[phone].    Thank you for your consultation.  If you have any question please call me at [phone]    Counseling :  A description of the counseling.  Goals and Barriers.  Patient's ability to self care: Yes  Medication side effect reviewed with patient in detail and all their questions answered to their satisfaction.      Primary Care Physician  MARK Iyer      HPI :     Alea Lovelace is a 88 y.o. year old female who was referred by primary care doctor for  Atrial fibrillation and to establish with practice.  Patient who used to live in [place] moved here as her  has passed away.  She used to follow up with cardiologist who has note from February 22nd I reviewed.  She has medical history significant as per note intermittent /paroxysmal atrial fibrillation, elevated Kris Vasc score, mild pulmonary hypertension, bronchiectasis, DJD and dyslipidemia.  She has an echo Doppler done in May 2021 which shows her EF is 55% mild left atrial enlargement with size of 4.4 cm mild-to-moderate MR and mild TR with PA pressure 35 mmHg.  She has been doing well her med list was reviewed she is taking Eliquis 2.5 twice a day Lasix 20 mg daily fenofibrate and Xyzal.      She had history of hysterectomy as well as shoulder surgery otherwise she is doing well.  History of cholecystectomy    12/16/2022.    Patient who has a medical history significant for paroxysmal atrial fibrillation, high [id number] score, mild pulm hypertension, history of bronchiectasis, DJD who came  for follow-up.  Her previous EF is around 55%.  She moved here as her son works here.  She walks with the help of a cane but she is pretty independent in her daily activity.  Heart positive closed history of hysterectomy.  She is otherwise doing well.  No nausea no vomiting no fever no chills no PND no orthopnea no other cardiovascular issues.    7/12/2023.    Above reviewed.  Patient who has medical history significant for paroxysmal atrial fibrillation, CHADS2- VASc score, mild pulmonary hypertension, history of bronchiectasis, DJD, dyslipidemia who came for follow-up.  She walks with the help of cane.  She is pretty independent with daily activities.  Her other medical history is positive for history of hysterectomy.  Currently she is having 6 injections in her foot from a podiatrist which has helped her.  EKG shows sinus rhythm heart rate 88 bpm left posterior fascicular block and QS in V1 to V3 due to lead location.  She is pretty compliant with her medications.  No other issues at this time.  Her blood test done with her primary care doctor which shows slightly elevated calcium other labs are acceptable.    3/14/2024.    Was reviewed.  Patient came for follow-up.  Medical history significant for paroxysmal atrial fibrillation, LAWRENCE DS 2 vas score high, mild pulmonary hypertension, history of bronchiectasis, DJD, dyslipidemia came for follow-up.  She was recently diagnosed to have a kidney mass 1.9 cm concerning for renal cell cancer but she does not want to do any cardiac workup at this time.  She has been issue with her foot pain and she walks with the help of cane EKG shows sinus rhythm heart rate 84 bpm and Q waves noted inferior leads most like due to pseudo infarct pattern.  Previous EKG shows she has a normal LV systolic function.  She has blood test done 2/16/2024 which has been acceptable and her vitals are stable today.      Review of Systems    Historical Information   Past Medical History:  "  Diagnosis Date   • A-fib (HCC)     from prior records   • Cerebral aneurysm, nonruptured 01/23/2024   • COPD (chronic obstructive pulmonary disease) (HCC)    • Heart disease 2016   • Hyperlipidemia    • MR (mitral regurgitation)    • Osteoarthritis 2020   • Pulmonary hypertension (HCC)    • Stroke (HCC)     Sister     Past Surgical History:   Procedure Laterality Date   • SHOULDER SURGERY  2016     Social History     Substance and Sexual Activity   Alcohol Use Not Currently     Social History     Substance and Sexual Activity   Drug Use Never     Social History     Tobacco Use   Smoking Status Never   Smokeless Tobacco Never     Family History:   Family History   Problem Relation Age of Onset   • Stroke Sister        Meds/Allergies     Allergies   Allergen Reactions   • Nitrofurantoin Other (See Comments)   • Tramadol Other (See Comments)       Current Outpatient Medications:   •  apixaban (ELIQUIS) 2.5 mg, Take 1 tablet (2.5 mg total) by mouth 2 (two) times a day, Disp: 180 tablet, Rfl: 3  •  Biotin 1 MG CAPS, Use daily, Disp: , Rfl:   •  Cholecalciferol (Vitamin D3) 1.25 MG (19221 UT) TABS, Take by mouth, Disp: , Rfl:   •  fenofibrate 160 MG tablet, Take 1 tablet (160 mg total) by mouth daily, Disp: 90 tablet, Rfl: 1  •  furosemide (LASIX) 20 mg tablet, Take 1 tablet (20 mg total) by mouth in the morning, Disp: 90 tablet, Rfl: 1  •  GNP CRANBERRY EXTRACT PO, Take 600 mg by mouth, Disp: , Rfl:   •  levocetirizine (XYZAL) 5 MG tablet, Take 5 mg by mouth every evening, Disp: , Rfl:   •  Multiple Vitamin (MULTIVITAMIN ADULT PO), Take 1 tablet by mouth daily, Disp: , Rfl:   •  timolol (TIMOPTIC) 0.25 % ophthalmic solution, 2 (two) times a day, Disp: , Rfl:     Vitals: Blood pressure 110/70, pulse 84, height 4' 10\" (1.473 m), weight 52.2 kg (115 lb), SpO2 98%.  ?  Body mass index is 24.04 kg/m².  Wt Readings from Last 3 Encounters:   03/14/24 52.2 kg (115 lb)   02/22/24 53.5 kg (118 lb)   02/16/24 53.1 kg (117 lb) "     Vitals:    03/14/24 1315   Weight: 52.2 kg (115 lb)     BP Readings from Last 3 Encounters:   03/14/24 110/70   02/22/24 120/70   02/16/24 142/77         Physical Exam  Constitutional:       General: She is not in acute distress.     Appearance: She is well-developed. She is not diaphoretic.   Neck:      Thyroid: No thyromegaly.      Vascular: No JVD.      Trachea: No tracheal deviation.   Cardiovascular:      Rate and Rhythm: Normal rate and regular rhythm.      Heart sounds: S1 normal and S2 normal. Heart sounds not distant. Murmur heard.      Systolic (ejection) murmur is present with a grade of 2/6.      No friction rub. No gallop. No S3 or S4 sounds.   Pulmonary:      Effort: Pulmonary effort is normal. No respiratory distress.      Breath sounds: No wheezing or rales.      Comments: Bilateral air entry with coarse breath sounds and coarse crackles most likely due to her bronchiectasis.  Chest:      Chest wall: No tenderness.   Abdominal:      General: Bowel sounds are normal. There is no distension.      Palpations: Abdomen is soft.      Tenderness: There is no abdominal tenderness.   Musculoskeletal:         General: No deformity.      Cervical back: Neck supple.   Skin:     General: Skin is warm and dry.      Coloration: Skin is not pale.      Findings: No rash.   Neurological:      Mental Status: She is alert and oriented to person, place, and time.   Psychiatric:         Behavior: Behavior normal.         Judgment: Judgment normal.             Diagnostic Studies Review Cardio:      Echo Doppler done in May 2021 in  [place] shows EF 55%, mild-to-moderate MR, mild TR with PA pressure 34 left atrial size was 4.4 cm.      EKG:  Twelve lead EKG 06/10/2022 shows normal sinus rhythm heart rate 65 beats per minute no other abnormality.    Twelve-lead EKG done on 12/16/2022 showed normal sinus and heart rate 75 bpm.  No change from other EKG    Twelve-lead EKG done on 7/12/2023 shows normal sinus them  "heart rate 88 bpm.  Left history of fascicular block.  Not much change from previous EKG    Twelve-lead EKG done 3/14/2024 shows sinus rhythm.  Inferior leads most like pseudo infarct pattern heart rate is 84 bpm not much change from previous EKG.        Dr. Tae Sim MD [facility]      \"This note has been constructed using a voice recognition system.Therefore there may be syntax, spelling, and/or grammatical errors. Please call if you have any questions. \"  "

## 2024-03-18 ENCOUNTER — OFFICE VISIT (OUTPATIENT)
Dept: FAMILY MEDICINE CLINIC | Facility: CLINIC | Age: 89
End: 2024-03-18
Payer: MEDICARE

## 2024-03-18 VITALS
DIASTOLIC BLOOD PRESSURE: 70 MMHG | WEIGHT: 119 LBS | OXYGEN SATURATION: 99 % | HEIGHT: 58 IN | SYSTOLIC BLOOD PRESSURE: 120 MMHG | RESPIRATION RATE: 16 BRPM | BODY MASS INDEX: 24.98 KG/M2 | HEART RATE: 68 BPM | TEMPERATURE: 97 F

## 2024-03-18 DIAGNOSIS — L30.9 VULVAR DERMATITIS: Primary | ICD-10-CM

## 2024-03-18 PROCEDURE — 99213 OFFICE O/P EST LOW 20 MIN: CPT | Performed by: NURSE PRACTITIONER

## 2024-03-18 PROCEDURE — G2211 COMPLEX E/M VISIT ADD ON: HCPCS | Performed by: NURSE PRACTITIONER

## 2024-03-18 RX ORDER — CLOBETASOL PROPIONATE 0.5 MG/G
CREAM TOPICAL 2 TIMES DAILY
Qty: 30 G | Refills: 3 | Status: SHIPPED | OUTPATIENT
Start: 2024-03-18

## 2024-03-18 NOTE — PATIENT INSTRUCTIONS
Clobetasol cream to affected area twice daily as needed.   Call or return to office if symptoms worsen or if new symptoms develop.

## 2024-03-18 NOTE — PROGRESS NOTES
"Name: Alea Lovelace      : 1935      MRN: [id number]  Encounter Provider: MARK Iyer  Encounter Date: 3/18/2024   Encounter department: [facility]    Assessment & Plan   1. Vulvar dermatitis  Clobetasol cream to affected area twice daily as needed.   Call or return to office if symptoms worsen or if new symptoms develop.   - clobetasol (TEMOVATE) 0.05 % cream; Apply topically 2 (two) times a day  Dispense: 30 g; Refill: 3         Subjective      Rash on vulva  Itchy and irritated. Few days.   No vaginal dryness or discharge. No vaginal pain or irritation.           Rash  Pertinent negatives include no fever.     Review of Systems   Constitutional:  Negative for fever.   Genitourinary:  Negative for vaginal bleeding, vaginal discharge and vaginal pain.   Skin:  Positive for rash.       Current Outpatient Medications on File Prior to Visit   Medication Sig    apixaban (ELIQUIS) 2.5 mg Take 1 tablet (2.5 mg total) by mouth 2 (two) times a day    Biotin 1 MG CAPS Use daily    Cholecalciferol (Vitamin D3) 1.25 MG (95133 UT) TABS Take by mouth    fenofibrate 160 MG tablet Take 1 tablet (160 mg total) by mouth daily    furosemide (LASIX) 20 mg tablet Take 1 tablet (20 mg total) by mouth in the morning    GNP CRANBERRY EXTRACT PO Take 600 mg by mouth    levocetirizine (XYZAL) 5 MG tablet Take 5 mg by mouth every evening    metoprolol succinate (TOPROL-XL) 25 mg 24 hr tablet Take 1 tablet (25 mg total) by mouth every other day    Multiple Vitamin (MULTIVITAMIN ADULT PO) Take 1 tablet by mouth daily    timolol (TIMOPTIC) 0.25 % ophthalmic solution 2 (two) times a day       Objective     /70 (BP Location: Left arm, Patient Position: Sitting, Cuff Size: Standard)   Pulse 68   Temp (!) 97 °F (36.1 °C)   Resp 16   Ht 4' 10\" (1.473 m)   Wt 54 kg (119 lb)   SpO2 99%   BMI 24.87 kg/m²     Physical Exam  Genitourinary:     Exam position: Knee-chest position.      Labia:  "        Left: Rash present.            MARK Iyer

## 2024-03-19 ENCOUNTER — TELEPHONE (OUTPATIENT)
Dept: CARDIOLOGY CLINIC | Facility: CLINIC | Age: 89
End: 2024-03-19

## 2024-03-20 ENCOUNTER — PROCEDURE VISIT (OUTPATIENT)
Dept: OBGYN CLINIC | Facility: CLINIC | Age: 89
End: 2024-03-20
Payer: MEDICARE

## 2024-03-20 DIAGNOSIS — M17.12 PRIMARY OSTEOARTHRITIS OF LEFT KNEE: Primary | ICD-10-CM

## 2024-03-20 PROCEDURE — 20610 DRAIN/INJ JOINT/BURSA W/O US: CPT | Performed by: ORTHOPAEDIC SURGERY

## 2024-03-20 RX ORDER — HYALURONATE SODIUM 10 MG/ML
20 SYRINGE (ML) INTRAARTICULAR
Status: COMPLETED | OUTPATIENT
Start: 2024-03-20 | End: 2024-03-20

## 2024-03-20 RX ADMIN — Medication 20 MG: at 10:30

## 2024-03-20 NOTE — PROGRESS NOTES
Assessment/Plan:  1. Primary osteoarthritis of left knee  Large joint arthrocentesis: ANGEL Link tolerated her third Euflexxa injection left knee today.  We could repeat these injections in 6 months if clinically indicated.    Subjective:   Alea Lovelace is a 88 y.o. female who presents for her third Euflexxa injection in the left knee         Past Medical History:   Diagnosis Date    A-fib (HCC)     from prior records    Cerebral aneurysm, nonruptured 01/23/2024    COPD (chronic obstructive pulmonary disease) (HCC)     Heart disease 2016    Hyperlipidemia     MR (mitral regurgitation)     Osteoarthritis 2020    Pulmonary hypertension (HCC)     Stroke (HCC)     Sister       Past Surgical History:   Procedure Laterality Date    SHOULDER SURGERY  2016       Family History   Problem Relation Age of Onset    Stroke Sister        Social History     Occupational History    Not on file   Tobacco Use    Smoking status: Never    Smokeless tobacco: Never   Vaping Use    Vaping status: Never Used   Substance and Sexual Activity    Alcohol use: Not Currently    Drug use: Never    Sexual activity: Not Currently     Partners: Male     Birth control/protection: Female Sterilization         Current Outpatient Medications:     apixaban (ELIQUIS) 2.5 mg, Take 1 tablet (2.5 mg total) by mouth 2 (two) times a day, Disp: 180 tablet, Rfl: 3    Biotin 1 MG CAPS, Use daily, Disp: , Rfl:     Cholecalciferol (Vitamin D3) 1.25 MG (85900 UT) TABS, Take by mouth, Disp: , Rfl:     clobetasol (TEMOVATE) 0.05 % cream, Apply topically 2 (two) times a day, Disp: 30 g, Rfl: 3    fenofibrate 160 MG tablet, Take 1 tablet (160 mg total) by mouth daily, Disp: 90 tablet, Rfl: 1    furosemide (LASIX) 20 mg tablet, Take 1 tablet (20 mg total) by mouth in the morning, Disp: 90 tablet, Rfl: 1    GNP CRANBERRY EXTRACT PO, Take 600 mg by mouth, Disp: , Rfl:     levocetirizine (XYZAL) 5 MG tablet, Take 5 mg by mouth every evening, Disp: , Rfl:      metoprolol succinate (TOPROL-XL) 25 mg 24 hr tablet, Take 1 tablet (25 mg total) by mouth every other day, Disp: 90 tablet, Rfl: 1    Multiple Vitamin (MULTIVITAMIN ADULT PO), Take 1 tablet by mouth daily, Disp: , Rfl:     timolol (TIMOPTIC) 0.25 % ophthalmic solution, 2 (two) times a day, Disp: , Rfl:     Allergies   Allergen Reactions    Nitrofurantoin Other (See Comments)    Tramadol Other (See Comments)       Objective:  There were no vitals filed for this visit.         Ortho Exam    Physical Exam    Large joint arthrocentesis: L knee  Universal Protocol:  Consent: Verbal consent obtained.  Risks and benefits: risks, benefits and alternatives were discussed  Consent given by: patient  Site marked: the operative site was marked  Supporting Documentation  Indications: pain   Procedure Details  Location: knee - L knee  Needle size: 22 G  Ultrasound guidance: no  Approach: anterolateral  Medications administered: 20 mg Sodium Hyaluronate (Viscosup) 20 MG/2ML    Patient tolerance: patient tolerated the procedure well with no immediate complications  Dressing:  Sterile dressing applied            This document was created using speech voice recognition software.   Grammatical errors, random word insertions, pronoun errors, and incomplete sentences are an occasional consequence of this system due to software limitations, ambient noise, and hardware issues.   Any formal questions or concerns about content, text, or information contained within the body of this dictation should be directly addressed to the provider for clarification.

## 2024-03-26 ENCOUNTER — HOSPITAL ENCOUNTER (OUTPATIENT)
Dept: NON INVASIVE DIAGNOSTICS | Facility: HOSPITAL | Age: 89
Discharge: HOME | End: 2024-03-26
Attending: INTERNAL MEDICINE
Payer: MEDICARE

## 2024-03-26 VITALS
DIASTOLIC BLOOD PRESSURE: 95 MMHG | WEIGHT: 119 LBS | SYSTOLIC BLOOD PRESSURE: 136 MMHG | HEIGHT: 58 IN | HEART RATE: 65 BPM | BODY MASS INDEX: 24.98 KG/M2

## 2024-03-26 DIAGNOSIS — I48.0 PAROXYSMAL ATRIAL FIBRILLATION (HCC): ICD-10-CM

## 2024-03-26 DIAGNOSIS — I11.9 HYPERTENSIVE HEART DISEASE WITHOUT HEART FAILURE: ICD-10-CM

## 2024-03-26 DIAGNOSIS — N28.89 LEFT KIDNEY MASS: ICD-10-CM

## 2024-03-26 DIAGNOSIS — E78.5 DYSLIPIDEMIA: ICD-10-CM

## 2024-03-26 DIAGNOSIS — J44.9 CHRONIC OBSTRUCTIVE PULMONARY DISEASE, UNSPECIFIED COPD TYPE (HCC): ICD-10-CM

## 2024-03-26 DIAGNOSIS — R01.1 CARDIAC MURMUR: ICD-10-CM

## 2024-03-26 DIAGNOSIS — I27.20 PULMONARY HYPERTENSION (HCC): ICD-10-CM

## 2024-03-26 LAB
AORTIC ROOT: 3.5 CM
AV LVOT PEAK GRADIENT: 3 MMHG
AV PEAK GRADIENT: 8 MMHG
BSA FOR ECHO PROCEDURE: 1.46 M2
DOP CALC LVOT AREA: 3.14 CM2
DOP CALC LVOT DIAMETER: 2 CM
E WAVE DECELERATION TIME: 255 MS
E/A RATIO: 0.68
FRACTIONAL SHORTENING: 34 (ref 28–44)
GLOBAL LONGITUIDAL STRAIN: -22 %
INTERVENTRICULAR SEPTUM IN DIASTOLE (PARASTERNAL SHORT AXIS VIEW): 0.7 CM
INTERVENTRICULAR SEPTUM: 0.7 CM (ref 0.6–1.1)
LAAS-AP2: 20.9 CM2
LAAS-AP4: 12 CM2
LEFT ATRIUM AREA SYSTOLE SINGLE PLANE A4C: 12.8 CM2
LEFT ATRIUM SIZE: 2.7 CM
LEFT ATRIUM VOLUME (MOD BIPLANE): 37 ML
LEFT ATRIUM VOLUME INDEX (MOD BIPLANE): 25.3 ML/M2
LEFT INTERNAL DIMENSION IN SYSTOLE: 2.7 CM (ref 2.1–4)
LEFT VENTRICULAR INTERNAL DIMENSION IN DIASTOLE: 4.1 CM (ref 3.5–6)
LEFT VENTRICULAR POSTERIOR WALL IN END DIASTOLE: 0.7 CM
LEFT VENTRICULAR STROKE VOLUME: 49 ML
LVSV (TEICH): 49 ML
MV E'TISSUE VEL-LAT: 6 CM/S
MV E'TISSUE VEL-SEP: 5 CM/S
MV PEAK A VEL: 1.05 M/S
MV PEAK E VEL: 71 CM/S
MV STENOSIS PRESSURE HALF TIME: 74 MS
MV VALVE AREA P 1/2 METHOD: 2.97
RA PRESSURE ESTIMATED: 8 MMHG
RIGHT ATRIUM AREA SYSTOLE A4C: 8 CM2
RIGHT VENTRICLE ID DIMENSION: 2.7 CM
RV PSP: 54 MMHG
SL CV LEFT ATRIUM LENGTH A2C: 6.6 CM
SL CV LV EF: 60
SL CV PED ECHO LEFT VENTRICLE DIASTOLIC VOLUME (MOD BIPLANE) 2D: 75 ML
SL CV PED ECHO LEFT VENTRICLE SYSTOLIC VOLUME (MOD BIPLANE) 2D: 26 ML
TR MAX PG: 46 MMHG
TR PEAK VELOCITY: 3.4 M/S
TRICUSPID ANNULAR PLANE SYSTOLIC EXCURSION: 2.1 CM
TRICUSPID VALVE PEAK REGURGITATION VELOCITY: 3.38 M/S

## 2024-03-26 PROCEDURE — 93306 TTE W/DOPPLER COMPLETE: CPT | Performed by: INTERNAL MEDICINE

## 2024-03-26 PROCEDURE — 93306 TTE W/DOPPLER COMPLETE: CPT

## 2024-03-27 ENCOUNTER — TELEPHONE (OUTPATIENT)
Dept: CARDIOLOGY CLINIC | Facility: CLINIC | Age: 89
End: 2024-03-27

## 2024-03-27 NOTE — TELEPHONE ENCOUNTER
----- Message from Tae Sim MD sent at 3/26/2024  4:46 PM EDT -----  Patient's echo shows normal LV systolic function.  However PA pressures were noted to be mild to moderately elevated.  We will follow-up on it.  She should keep her appointments.

## 2024-05-01 DIAGNOSIS — I48.0 PAROXYSMAL ATRIAL FIBRILLATION (HCC): ICD-10-CM

## 2024-05-01 DIAGNOSIS — T78.40XD ALLERGY, SUBSEQUENT ENCOUNTER: Primary | ICD-10-CM

## 2024-05-01 NOTE — TELEPHONE ENCOUNTER
Reason for call: Patient requested 90D supply with 3 refills for eliquis    [x] Refill   [] Prior Auth  [] Other:     Office:   [x] PCP/Provider - Dr Rodriguez   [] Specialty/Provider -     Medication: eliquis / xyzal      Dose/Frequency: 2.5mg BID/ 5 mg daily     Quantity: 90D w refill     Pharmacy: [facility]     Does the patient have enough for 3 days?   [x] Yes   [] No - Send as HP to POD

## 2024-05-03 RX ORDER — LEVOCETIRIZINE DIHYDROCHLORIDE 5 MG/1
5 TABLET, FILM COATED ORAL EVERY EVENING
Qty: 90 TABLET | Refills: 1 | Status: SHIPPED | OUTPATIENT
Start: 2024-05-03

## 2024-05-15 ENCOUNTER — OFFICE VISIT (OUTPATIENT)
Dept: OBGYN CLINIC | Facility: CLINIC | Age: 89
End: 2024-05-15
Payer: MEDICARE

## 2024-05-15 VITALS
WEIGHT: 119 LBS | BODY MASS INDEX: 24.98 KG/M2 | SYSTOLIC BLOOD PRESSURE: 115 MMHG | HEART RATE: 76 BPM | HEIGHT: 58 IN | DIASTOLIC BLOOD PRESSURE: 79 MMHG

## 2024-05-15 DIAGNOSIS — M17.12 PRIMARY OSTEOARTHRITIS OF LEFT KNEE: Primary | ICD-10-CM

## 2024-05-15 PROCEDURE — 20610 DRAIN/INJ JOINT/BURSA W/O US: CPT | Performed by: ORTHOPAEDIC SURGERY

## 2024-05-15 PROCEDURE — 99213 OFFICE O/P EST LOW 20 MIN: CPT | Performed by: ORTHOPAEDIC SURGERY

## 2024-05-15 RX ORDER — TRIAMCINOLONE ACETONIDE 40 MG/ML
40 INJECTION, SUSPENSION INTRA-ARTICULAR; INTRAMUSCULAR
Status: COMPLETED | OUTPATIENT
Start: 2024-05-15 | End: 2024-05-15

## 2024-05-15 RX ORDER — LIDOCAINE HYDROCHLORIDE 10 MG/ML
4 INJECTION, SOLUTION INFILTRATION; PERINEURAL
Status: COMPLETED | OUTPATIENT
Start: 2024-05-15 | End: 2024-05-15

## 2024-05-15 RX ADMIN — TRIAMCINOLONE ACETONIDE 40 MG: 40 INJECTION, SUSPENSION INTRA-ARTICULAR; INTRAMUSCULAR at 11:30

## 2024-05-15 RX ADMIN — LIDOCAINE HYDROCHLORIDE 4 ML: 10 INJECTION, SOLUTION INFILTRATION; PERINEURAL at 11:30

## 2024-05-15 NOTE — PROGRESS NOTES
"Assessment/Plan:  1. Primary osteoarthritis of left knee  Ambulatory referral to Spine & Pain Management    Large joint arthrocentesis: L knee        Scribe Attestation      I,:  Myles Saravia am acting as a scribe while in the presence of the attending physician.:       I,:  Phuc Torres, DO personally performed the services described in this documentation    as scribed in my presence.:             Fariba has severe lateral compartment osteoarthritis causing pain.  Previous injection therapies have provided no relief.  Knee replacement surgery is not an option for her.  We discussed additional treatment options such as a lateral  brace.  She is concerned over the bulkiness of the brace and is not interested.  Another option is referral to pain management for their consideration of providing a genicular nerve block.  She was amenable to this.  I did offer a steroid injection today.  She found no relief with dexamethasone so I would like to try Kenalog today.  We discussed the risks and benefits of receiving a steroid injection and she provided verbal consent to proceed.  She tolerated the procedure well.  Postinjection instructions were provided.  A referral to pain management was placed into her chart with Dr. Willa Avila.  Large joint arthrocentesis: L knee  Universal Protocol:  Consent: Verbal consent obtained.  Risks and benefits: risks, benefits and alternatives were discussed  Consent given by: patient  Time out: Immediately prior to procedure a \"time out\" was called to verify the correct patient, procedure, equipment, support staff and site/side marked as required.  Patient understanding: patient states understanding of the procedure being performed  Patient consent: the patient's understanding of the procedure matches consent given  Patient identity confirmed: verbally with patient  Supporting Documentation  Indications: pain   Procedure Details  Location: knee - L knee  Preparation: Patient was " prepped and draped in the usual sterile fashion  Needle size: 22 G  Approach: anterolateral  Medications administered: 4 mL lidocaine 1 %; 40 mg triamcinolone acetonide 40 mg/mL    Patient tolerance: patient tolerated the procedure well with no immediate complications  Dressing:  Sterile dressing applied          Subjective:   Alea Lovelace is a 88 y.o. female who presents for follow up evaluation of left knee pain.  Fariba has been treated with steroid injection and a round viscosupplementation previously with no success.  She returns today as her knee pain has worsened.  She complains of a lateral knee pain with weight bearing activities.      Review of Systems   Constitutional:  Negative for chills, fever and unexpected weight change.   HENT:  Negative for hearing loss, nosebleeds and sore throat.    Eyes:  Negative for pain, redness and visual disturbance.   Respiratory:  Negative for cough, shortness of breath and wheezing.    Cardiovascular:  Negative for chest pain, palpitations and leg swelling.   Gastrointestinal:  Negative for abdominal pain, nausea and vomiting.   Endocrine: Negative for polydipsia and polyuria.   Genitourinary:  Negative for dysuria and hematuria.   Musculoskeletal:         See HPI   Skin:  Negative for rash and wound.   Neurological:  Negative for dizziness, numbness and headaches.   Psychiatric/Behavioral:  Negative for decreased concentration and suicidal ideas. The patient is not nervous/anxious.          Past Medical History:   Diagnosis Date    A-fib (HCC)     from prior records    Cerebral aneurysm, nonruptured 01/23/2024    COPD (chronic obstructive pulmonary disease) ([facility])     Heart disease 2016    Hyperlipidemia     MR (mitral regurgitation)     Osteoarthritis 2020    Pulmonary hypertension ([facility])     Stroke ([facility])     Sister       Past Surgical History:   Procedure Laterality Date    SHOULDER SURGERY  2016       Family History   Problem Relation Age of Onset    Stroke Sister         Social History     Occupational History    Not on file   Tobacco Use    Smoking status: Never    Smokeless tobacco: Never   Vaping Use    Vaping status: Never Used   Substance and Sexual Activity    Alcohol use: Not Currently    Drug use: Never    Sexual activity: Not Currently     Partners: Male     Birth control/protection: Female Sterilization         Current Outpatient Medications:     apixaban (ELIQUIS) 2.5 mg, Take 1 tablet (2.5 mg total) by mouth 2 (two) times a day, Disp: 180 tablet, Rfl: 1    Biotin 1 MG CAPS, Use daily, Disp: , Rfl:     Cholecalciferol (Vitamin D3) 1.25 MG (87146 UT) TABS, Take by mouth, Disp: , Rfl:     clobetasol (TEMOVATE) 0.05 % cream, Apply topically 2 (two) times a day, Disp: 30 g, Rfl: 3    fenofibrate 160 MG tablet, Take 1 tablet (160 mg total) by mouth daily, Disp: 90 tablet, Rfl: 1    furosemide (LASIX) 20 mg tablet, Take 1 tablet (20 mg total) by mouth in the morning, Disp: 90 tablet, Rfl: 1    GNP CRANBERRY EXTRACT PO, Take 600 mg by mouth, Disp: , Rfl:     levocetirizine (XYZAL) 5 MG tablet, Take 1 tablet (5 mg total) by mouth every evening, Disp: 90 tablet, Rfl: 1    metoprolol succinate (TOPROL-XL) 25 mg 24 hr tablet, Take 1 tablet (25 mg total) by mouth every other day, Disp: 90 tablet, Rfl: 1    Multiple Vitamin (MULTIVITAMIN ADULT PO), Take 1 tablet by mouth daily, Disp: , Rfl:     timolol (TIMOPTIC) 0.25 % ophthalmic solution, 2 (two) times a day, Disp: , Rfl:     Allergies   Allergen Reactions    Nitrofurantoin Other (See Comments)    Tramadol Other (See Comments)       Objective:  Vitals:    05/15/24 1128   BP: 115/79   Pulse: 76       Left Knee Exam     Muscle Strength   The patient has normal left knee strength.    Tenderness   The patient is experiencing tenderness in the lateral joint line.    Range of Motion   Extension:  0   Flexion:  120     Tests   Joao:  Medial - negative Lateral - negative  Varus: negative Valgus: negative  Drawer:  Anterior -  negative     Posterior - negative    Other   Erythema: absent  Scars: absent  Sensation: normal  Swelling: none  Effusion: no effusion present          Observations   Left Knee   Negative for effusion.       Physical Exam  Vitals reviewed.   Constitutional:       Appearance: She is well-developed.   HENT:      Head: Normocephalic and atraumatic.   Eyes:      General:         Right eye: No discharge.         Left eye: No discharge.      Conjunctiva/sclera: Conjunctivae normal.   Cardiovascular:      Rate and Rhythm: Regular rhythm.   Pulmonary:      Effort: Pulmonary effort is normal. No respiratory distress.      Breath sounds: No stridor.   Musculoskeletal:      Cervical back: Normal range of motion and neck supple.      Left knee: No effusion.      Instability Tests: Medial Joao test negative and lateral Joao test negative.      Comments: As noted in the HPI.   Skin:     General: Skin is warm and dry.   Neurological:      Mental Status: She is alert and oriented to person, place, and time.   Psychiatric:         Behavior: Behavior normal.         I have personally reviewed pertinent films in PACS and my interpretation is as follows:  Previous x-rays of the left knee performed in October 2023 were reviewed and show evidence of severe lateral compartment osteoarthritis.      This document was created using speech voice recognition software.   Grammatical errors, random word insertions, pronoun errors, and incomplete sentences are an occasional consequence of this system due to software limitations, ambient noise, and hardware issues.   Any formal questions or concerns about content, text, or information contained within the body of this dictation should be directly addressed to the provider for clarification.

## 2024-05-16 ENCOUNTER — TELEPHONE (OUTPATIENT)
Age: 89
End: 2024-05-16

## 2024-05-16 NOTE — TELEPHONE ENCOUNTER
Caller: Self    Doctor: Brian    Reason for call: Wants to let provider knee, she has had great relief from the injection    Call back#: [phone]

## 2024-05-28 ENCOUNTER — TELEPHONE (OUTPATIENT)
Dept: PAIN MEDICINE | Facility: CLINIC | Age: 89
End: 2024-05-28

## 2024-05-28 ENCOUNTER — CONSULT (OUTPATIENT)
Age: 89
End: 2024-05-28
Payer: MEDICARE

## 2024-05-28 VITALS
SYSTOLIC BLOOD PRESSURE: 120 MMHG | HEIGHT: 58 IN | HEART RATE: 67 BPM | DIASTOLIC BLOOD PRESSURE: 82 MMHG | BODY MASS INDEX: 24.98 KG/M2 | WEIGHT: 119 LBS

## 2024-05-28 DIAGNOSIS — M17.12 PRIMARY OSTEOARTHRITIS OF LEFT KNEE: Primary | ICD-10-CM

## 2024-05-28 PROCEDURE — 99204 OFFICE O/P NEW MOD 45 MIN: CPT | Performed by: STUDENT IN AN ORGANIZED HEALTH CARE EDUCATION/TRAINING PROGRAM

## 2024-05-28 RX ORDER — TIMOLOL MALEATE 5 MG/ML
SOLUTION/ DROPS OPHTHALMIC
COMMUNITY
Start: 2024-05-20

## 2024-05-28 NOTE — PROGRESS NOTES
Assessment  1. Primary osteoarthritis of left knee    Patient is a pleasant 88-year-old woman presenting with 2 years of left knee pain.  Patient referred by Dr. Torres for consideration of left genicular nerve block.  Patient is not a surgical candidate.  Over the past month the intensity pains been moderate and she rates pain currently as a 6 out of 10 on numeric rating scale.  There is no typical pattern when symptoms are better or worse and she describes the pain as a sharp pain.  She does have some associated weakness in the lower extremities and hurts to stand.  Patient does use a cane.  Of note patient has chronic blood thinning medications that she is on apixaban.  Activities that increase her pain include bending, walking, exercise.  Patient is have x-ray of her left knee from 10/17/2023 which is significant for severe osteoarthritis with subchondral sclerosis.  Past medical history significant for hypertension and arthritis.  Prior pain treatments include physical therapy which provided no relief and acupuncture which provided no relief.  Patient is on blood thinning medications for A-fib.  Current medications include Tylenol. Images independently reviewed and discussed with patient.     Given has failed multiple conservative measures including physical therapy, HEP, multiple corticosteroid injections along with gel injections of the knee, I think it is reasonable to pursue left diagnostic genicular nerve block followed by genicular RFA if 80% benefit is shown. For symptomatic relief, patient can use OTC voltaren gel. Discussed with patient that systemic absorption is low compared to oral NSAIDs, around 6%, though it may increase risk of bleeding while on blood thinning medications, she can use occasionally.     Plan  Schedule for diagnostic left geniculate nerve block   Continue apap 975 mg TID prn  Start diclofenac ointment 1% BID prn  Continue HEP       Complete risks and benefits including bleeding,  infection, tissue reaction, nerve injury and allergic reaction were discussed. The approach was demonstrated using models and literature was provided. Verbal and written consent was obtained.    My impressions and treatment recommendations were discussed in detail with the patient who verbalized understanding and had no further questions.  Discharge instructions were provided. I personally saw and examined the patient and I agree with the above discussed plan of care.    No orders of the defined types were placed in this encounter.    New Medications Ordered This Visit   Medications   • timolol (TIMOPTIC) 0.5 % ophthalmic solution       History of Present Illness    Alea Lovelace is a 88 y.o. female   Patient is a pleasant 88-year-old woman presenting with 2 years of left knee pain.  Patient referred by Dr. Torres for consideration of left genicular nerve block.  Patient is not a surgical candidate.  Over the past month the intensity pains been moderate and she rates pain currently as a 6 out of 10 on numeric rating scale.  There is no typical pattern when symptoms are better or worse and she describes the pain as a sharp pain.  She does have some associated weakness in the lower extremities and hurts to stand.  Patient does use a cane.  Of note patient has chronic blood thinning medications that she is on apixaban.  Activities that increase her pain include bending, walking, exercise.  Patient is have x-ray of her left knee from 10/17/2023 which is significant for severe osteoarthritis with subchondral sclerosis.  Past medical history significant for hypertension and arthritis.  Prior pain treatments include physical therapy which provided no relief and acupuncture which provided no relief.  Patient is on blood thinning medications for A-fib.  Current medications include Tylenol.    I have personally reviewed and/or updated the patient's past medical history, past surgical history, family history, social  history, current medications, allergies, and vital signs today.     Review of Systems   Constitutional:  Negative for chills, fatigue and unexpected weight change.   HENT:  Negative for ear pain, sore throat and trouble swallowing.    Eyes:  Negative for redness and visual disturbance.   Respiratory:  Negative for cough and shortness of breath.    Cardiovascular:  Negative for chest pain and leg swelling.   Gastrointestinal:  Negative for abdominal pain, diarrhea, nausea and vomiting.   Endocrine: Negative for polydipsia and polyuria.   Genitourinary:  Negative for difficulty urinating.   Musculoskeletal:  Positive for arthralgias and gait problem. Negative for joint swelling and myalgias.   Skin:  Negative for rash.   Allergic/Immunologic: Negative for food allergies.   Neurological:  Negative for dizziness, numbness and headaches.   Hematological:  Does not bruise/bleed easily.   Psychiatric/Behavioral:  Negative for dysphoric mood and sleep disturbance.        Patient Active Problem List   Diagnosis   • Paroxysmal atrial fibrillation (HCC)   • Dyslipidemia   • Chronic obstructive pulmonary disease (HCC)   • Pulmonary hypertension (HCC)   • Cardiac murmur   • Cerebral aneurysm, nonruptured   • Glaucoma   • Osteoarthritis   • Hypertriglyceridemia       Past Medical History:   Diagnosis Date   • A-fib (HCC)     from prior records   • Cerebral aneurysm, nonruptured 01/23/2024   • COPD (chronic obstructive pulmonary disease) (HCC)    • Heart disease 2016   • Hyperlipidemia    • MR (mitral regurgitation)    • Osteoarthritis 2020   • Pulmonary hypertension (HCC)    • Stroke (HCC)     Sister       Past Surgical History:   Procedure Laterality Date   • SHOULDER SURGERY  2016       Family History   Problem Relation Age of Onset   • Stroke Sister        Social History     Occupational History   • Not on file   Tobacco Use   • Smoking status: Never   • Smokeless tobacco: Never   Vaping Use   • Vaping status: Never Used  "  Substance and Sexual Activity   • Alcohol use: Not Currently   • Drug use: Never   • Sexual activity: Not Currently     Partners: Male     Birth control/protection: Female Sterilization       Current Outpatient Medications on File Prior to Visit   Medication Sig   • apixaban (ELIQUIS) 2.5 mg Take 1 tablet (2.5 mg total) by mouth 2 (two) times a day   • Biotin 1 MG CAPS Use daily   • Cholecalciferol (Vitamin D3) 1.25 MG (38020 UT) TABS Take by mouth   • clobetasol (TEMOVATE) 0.05 % cream Apply topically 2 (two) times a day   • fenofibrate 160 MG tablet Take 1 tablet (160 mg total) by mouth daily   • furosemide (LASIX) 20 mg tablet Take 1 tablet (20 mg total) by mouth in the morning   • GNP CRANBERRY EXTRACT PO Take 600 mg by mouth   • levocetirizine (XYZAL) 5 MG tablet Take 1 tablet (5 mg total) by mouth every evening   • metoprolol succinate (TOPROL-XL) 25 mg 24 hr tablet Take 1 tablet (25 mg total) by mouth every other day   • Multiple Vitamin (MULTIVITAMIN ADULT PO) Take 1 tablet by mouth daily   • timolol (TIMOPTIC) 0.5 % ophthalmic solution    • [DISCONTINUED] timolol (TIMOPTIC) 0.25 % ophthalmic solution 2 (two) times a day     No current facility-administered medications on file prior to visit.       Allergies   Allergen Reactions   • Nitrofurantoin Other (See Comments)   • Tramadol Other (See Comments)       Physical Exam    /82   Pulse 67   Ht 4' 10\" (1.473 m)   Wt 54 kg (119 lb)   BMI 24.87 kg/m²     Constitutional: normal, well developed, well nourished, alert, in no distress and non-toxic and no overt pain behavior.  Eyes: anicteric  HEENT: grossly intact  Neck: supple, symmetric, trachea midline and no masses   Pulmonary:even and unlabored  Cardiovascular:No edema or pitting edema present  Skin:Normal without rashes or lesions and well hydrated  Psychiatric:Mood and affect appropriate  Neurologic:Cranial Nerves II-XII grossly intact  Musculoskeletal:ambulates with cane.     Imaging  XR Left " knee 10/17/2023  LEFT KNEE     INDICATION:   trauma. Left knee pain     COMPARISON:  None     VIEWS:  XR KNEE 4+ VW LEFT INJURY        FINDINGS:     There is no acute fracture or dislocation.     There is no joint effusion.     Left knee osteoarthritis noted, with severe lateral compartment narrowing. Marginal hypertrophic spurring is seen. Subchondral sclerosis.     No lytic or blastic osseous lesion.     Soft tissues are unremarkable.     IMPRESSION:     Severe osteoarthritis of the left knee lateral tibiofemoral compartment.     No acute osseous abnormality

## 2024-05-28 NOTE — TELEPHONE ENCOUNTER
Schedule patient for left genicular nerve block 06/14/2024  Nothing to eat or drink one hour prior to procedure  Need to arrange transportation  Proper clothing for procedure  If ill or placed on antibiotics please call to reschedule

## 2024-05-29 ENCOUNTER — TELEPHONE (OUTPATIENT)
Age: 89
End: 2024-05-29

## 2024-05-29 ENCOUNTER — TELEPHONE (OUTPATIENT)
Dept: OBGYN CLINIC | Facility: MEDICAL CENTER | Age: 89
End: 2024-05-29

## 2024-05-29 NOTE — TELEPHONE ENCOUNTER
Caller: Fariba     Doctor: Dr Torres     Reason for call: The patient is calling and asking to speak with Dr Torres regarding pain management and the injection given at her last visit on May 15.    Call back#: [phone]

## 2024-05-29 NOTE — TELEPHONE ENCOUNTER
Spoke with patient who is inquiring if she can use Voltaren gel on her foot with arthritis, she is on Eliquis 2.5mg daily.    Please call patient with update, permission to leave a message given if she doesn't answer.    Please advise.

## 2024-05-29 NOTE — TELEPHONE ENCOUNTER
Please cancel procedure with Dr. Avila, she is still feeling semi relief from CSI given 5/15.   Advised patient if the pain in her knee persists to call back and reschedule the nerve block. She gave verbal understanding and will see how the next couple of weeks go

## 2024-07-11 ENCOUNTER — OFFICE VISIT (OUTPATIENT)
Dept: FAMILY MEDICINE CLINIC | Facility: CLINIC | Age: 89
End: 2024-07-11
Payer: MEDICARE

## 2024-07-11 VITALS
TEMPERATURE: 97 F | SYSTOLIC BLOOD PRESSURE: 122 MMHG | WEIGHT: 116.6 LBS | RESPIRATION RATE: 18 BRPM | HEART RATE: 70 BPM | BODY MASS INDEX: 24.37 KG/M2 | DIASTOLIC BLOOD PRESSURE: 80 MMHG

## 2024-07-11 DIAGNOSIS — I48.0 PAROXYSMAL ATRIAL FIBRILLATION (HCC): ICD-10-CM

## 2024-07-11 DIAGNOSIS — N39.0 URINARY TRACT INFECTION WITHOUT HEMATURIA, SITE UNSPECIFIED: Primary | ICD-10-CM

## 2024-07-11 DIAGNOSIS — M54.50 ACUTE BILATERAL LOW BACK PAIN WITHOUT SCIATICA: ICD-10-CM

## 2024-07-11 LAB
SL AMB  POCT GLUCOSE, UA: ABNORMAL
SL AMB LEUKOCYTE ESTERASE,UA: 500
SL AMB POCT BILIRUBIN,UA: ABNORMAL
SL AMB POCT BLOOD,UA: ABNORMAL
SL AMB POCT CLARITY,UA: CLEAR
SL AMB POCT COLOR,UA: YELLOW
SL AMB POCT KETONES,UA: ABNORMAL
SL AMB POCT NITRITE,UA: ABNORMAL
SL AMB POCT PH,UA: 6.5
SL AMB POCT SPECIFIC GRAVITY,UA: 1

## 2024-07-11 PROCEDURE — 87086 URINE CULTURE/COLONY COUNT: CPT | Performed by: NURSE PRACTITIONER

## 2024-07-11 PROCEDURE — 87186 SC STD MICRODIL/AGAR DIL: CPT | Performed by: NURSE PRACTITIONER

## 2024-07-11 PROCEDURE — 81000 URINALYSIS NONAUTO W/SCOPE: CPT | Performed by: NURSE PRACTITIONER

## 2024-07-11 PROCEDURE — 99214 OFFICE O/P EST MOD 30 MIN: CPT | Performed by: NURSE PRACTITIONER

## 2024-07-11 PROCEDURE — 87077 CULTURE AEROBIC IDENTIFY: CPT | Performed by: NURSE PRACTITIONER

## 2024-07-11 RX ORDER — METOPROLOL SUCCINATE 25 MG/1
25 TABLET, EXTENDED RELEASE ORAL EVERY OTHER DAY
Qty: 100 TABLET | Refills: 0 | Status: SHIPPED | OUTPATIENT
Start: 2024-07-11

## 2024-07-11 RX ORDER — SULFAMETHOXAZOLE AND TRIMETHOPRIM 800; 160 MG/1; MG/1
1 TABLET ORAL EVERY 12 HOURS SCHEDULED
Qty: 10 TABLET | Refills: 0 | Status: SHIPPED | OUTPATIENT
Start: 2024-07-11 | End: 2024-07-16

## 2024-07-11 RX ORDER — BACLOFEN 10 MG/1
10 TABLET ORAL
Qty: 20 TABLET | Refills: 0 | Status: SHIPPED | OUTPATIENT
Start: 2024-07-11

## 2024-07-11 NOTE — PROGRESS NOTES
Ambulatory Visit  Name: Alea Lovelace      : 1935      MRN: [id number]  Encounter Provider: MARK Iyer  Encounter Date: 2024   Encounter department: [facility]    Assessment & Plan   1. Acute bilateral low back pain without sciatica  - POCT urine dip non-auto scope  - baclofen 10 mg tablet; Take 1 tablet (10 mg total) by mouth daily at bedtime as needed for muscle spasms  Dispense: 20 tablet; Refill: 0    2. Urinary tract infection without hematuria, site unspecified  Will send urine out for culture.   Push fluids  Finish antibiotics as prescribed. Recommend taking antibiotics with food. Bactrim DS 1 tablet twice daily for 5 days.   Take probiotic while taking antibiotics to minimize gastrointestinal side effects.   - Urine culture  - sulfamethoxazole-trimethoprim (BACTRIM DS) 800-160 mg per tablet; Take 1 tablet by mouth every 12 (twelve) hours for 5 days  Dispense: 10 tablet; Refill: 0       History of Present Illness     Here for low back pain x 1 week  No known injury  No fever. No dysuria, no urinary frequency  No radiation of pain to legs. No associated numbness/tingling.   History of recurrent uti. Saw urology, Dr Al, about 2 weeks ago and had no signs of infection at that time.   Back pain is worse at night.         Back Pain  Pertinent negatives include no abdominal pain, dysuria or fever.       Review of Systems   Constitutional:  Negative for fever.   Gastrointestinal:  Negative for abdominal distention and abdominal pain.   Genitourinary:  Negative for dysuria and frequency.   Musculoskeletal:  Positive for back pain.   Skin:  Negative for rash and wound.   Allergic/Immunologic: Negative for immunocompromised state.       Objective     /80   Pulse 70   Temp (!) 97 °F (36.1 °C)   Resp 18   Wt 52.9 kg (116 lb 9.6 oz)   BMI 24.37 kg/m²     Poct UA: leuk 500, nitrites positive    Physical Exam  Vitals reviewed.   Constitutional:        General: She is not in acute distress.     Appearance: She is not ill-appearing.   Cardiovascular:      Rate and Rhythm: Normal rate.   Pulmonary:      Effort: Pulmonary effort is normal.   Abdominal:      General: There is no distension.      Palpations: Abdomen is soft.      Tenderness: There is no right CVA tenderness or left CVA tenderness.   Skin:     General: Skin is warm and dry.      Coloration: Skin is not jaundiced or pale.   Neurological:      General: No focal deficit present.      Mental Status: She is alert and oriented to person, place, and time.   Psychiatric:         Mood and Affect: Mood normal.         Behavior: Behavior normal.         Thought Content: Thought content normal.         Judgment: Judgment normal.       Administrative Statements

## 2024-07-11 NOTE — PATIENT INSTRUCTIONS
Will send urine out for culture.   Push fluids  Finish antibiotics as prescribed. Recommend taking antibiotics with food. Bactrim DS 1 tablet twice daily for 5 days.   Take probiotic while taking antibiotics to minimize gastrointestinal side effects.   Baclofen 10mg at bedtime as needed for back pain.

## 2024-07-14 LAB
BACTERIA UR CULT: ABNORMAL

## 2024-07-15 DIAGNOSIS — N39.0 URINARY TRACT INFECTION WITHOUT HEMATURIA, SITE UNSPECIFIED: Primary | ICD-10-CM

## 2024-07-15 RX ORDER — AMOXICILLIN AND CLAVULANATE POTASSIUM 875; 125 MG/1; MG/1
1 TABLET, FILM COATED ORAL EVERY 12 HOURS SCHEDULED
Qty: 14 TABLET | Refills: 0 | Status: SHIPPED | OUTPATIENT
Start: 2024-07-15 | End: 2024-07-22

## 2024-07-22 ENCOUNTER — APPOINTMENT (OUTPATIENT)
Dept: LAB | Facility: CLINIC | Age: 89
End: 2024-07-22
Payer: MEDICARE

## 2024-07-22 ENCOUNTER — TELEPHONE (OUTPATIENT)
Age: 89
End: 2024-07-22

## 2024-07-22 DIAGNOSIS — N39.0 URINARY TRACT INFECTION WITHOUT HEMATURIA, SITE UNSPECIFIED: Primary | ICD-10-CM

## 2024-07-22 DIAGNOSIS — N39.0 URINARY TRACT INFECTION WITHOUT HEMATURIA, SITE UNSPECIFIED: ICD-10-CM

## 2024-07-22 LAB
BILIRUB UR QL STRIP: NEGATIVE
CLARITY UR: CLEAR
COLOR UR: NORMAL
GLUCOSE UR STRIP-MCNC: NEGATIVE MG/DL
HGB UR QL STRIP.AUTO: NEGATIVE
KETONES UR STRIP-MCNC: NEGATIVE MG/DL
LEUKOCYTE ESTERASE UR QL STRIP: NEGATIVE
NITRITE UR QL STRIP: NEGATIVE
PH UR STRIP.AUTO: 6.5 [PH]
PROT UR STRIP-MCNC: NEGATIVE MG/DL
SP GR UR STRIP.AUTO: 1.01 (ref 1–1.03)
UROBILINOGEN UR STRIP-ACNC: <2 MG/DL

## 2024-07-22 PROCEDURE — 81003 URINALYSIS AUTO W/O SCOPE: CPT

## 2024-07-22 NOTE — TELEPHONE ENCOUNTER
It may be pointless now, but the concern with older patients is that they do not always have symptoms and it can become very serious if not treated.   She probably should just have a repeat UA done. I will place the order and she can go to the lab

## 2024-07-22 NOTE — TELEPHONE ENCOUNTER
Pt called, she did not take the last three doses of augmentin, she was out of town.  She wants to know if this is ok, or does she need to take them now.    She is not having any UTI sxs, although she said she never had sxs in the beginning.

## 2024-07-31 DIAGNOSIS — I27.20 PULMONARY HYPERTENSION (HCC): ICD-10-CM

## 2024-07-31 NOTE — TELEPHONE ENCOUNTER
Reason for call:   [x] Refill   [] Prior Auth  [] Other:     Office:   [x] PCP/Provider - MERRY LIMA  Authorized By: MARK Iyer  [] Specialty/Provider -     Medication: furosemide (LASIX) 20 mg tablet     Dose/Frequency: Take 1 tablet (20 mg total) by mouth in the morning     Quantity: 90 tablet     Pharmacy: [facility] - [place] - [address]      Does the patient have enough for 3 days?   [x] Yes   [] No - Send as HP to POD

## 2024-08-01 RX ORDER — FUROSEMIDE 20 MG/1
20 TABLET ORAL DAILY
Qty: 90 TABLET | Refills: 1 | Status: SHIPPED | OUTPATIENT
Start: 2024-08-01

## 2024-08-08 ENCOUNTER — OFFICE VISIT (OUTPATIENT)
Dept: FAMILY MEDICINE CLINIC | Facility: CLINIC | Age: 89
End: 2024-08-08
Payer: MEDICARE

## 2024-08-08 VITALS
TEMPERATURE: 96.4 F | WEIGHT: 114.5 LBS | HEIGHT: 56 IN | DIASTOLIC BLOOD PRESSURE: 72 MMHG | SYSTOLIC BLOOD PRESSURE: 116 MMHG | BODY MASS INDEX: 25.76 KG/M2 | HEART RATE: 81 BPM

## 2024-08-08 DIAGNOSIS — R32 URINARY INCONTINENCE, UNSPECIFIED TYPE: ICD-10-CM

## 2024-08-08 DIAGNOSIS — Z00.00 MEDICARE ANNUAL WELLNESS VISIT, SUBSEQUENT: Primary | ICD-10-CM

## 2024-08-08 PROCEDURE — G0439 PPPS, SUBSEQ VISIT: HCPCS | Performed by: NURSE PRACTITIONER

## 2024-08-08 NOTE — PATIENT INSTRUCTIONS
Medicare Preventive Visit Patient Instructions  Thank you for completing your Welcome to Medicare Visit or Medicare Annual Wellness Visit today. Your next wellness visit will be due in one year (8/9/2025).  The screening/preventive services that you may require over the next 5-10 years are detailed below. Some tests may not apply to you based off risk factors and/or age. Screening tests ordered at today's visit but not completed yet may show as past due. Also, please note that scanned in results may not display below.  Preventive Screenings:  Service Recommendations Previous Testing/Comments   Colorectal Cancer Screening  * Colonoscopy    * Fecal Occult Blood Test (FOBT)/Fecal Immunochemical Test (FIT)  * Fecal DNA/Cologuard Test  * Flexible Sigmoidoscopy Age: 45-75 years old   Colonoscopy: every 10 years (may be performed more frequently if at higher risk)  OR  FOBT/FIT: every 1 year  OR  Cologuard: every 3 years  OR  Sigmoidoscopy: every 5 years  Screening may be recommended earlier than age 45 if at higher risk for colorectal cancer. Also, an individualized decision between you and your healthcare provider will decide whether screening between the ages of 76-85 would be appropriate. Colonoscopy: Not on file  FOBT/FIT: Not on file  Cologuard: Not on file  Sigmoidoscopy: Not on file    Screening Not Indicated     Breast Cancer Screening Age: 40+ years old  Frequency: every 1-2 years  Not required if history of left and right mastectomy Mammogram: Not on file        Cervical Cancer Screening Between the ages of 21-29, pap smear recommended once every 3 years.   Between the ages of 30-65, can perform pap smear with HPV co-testing every 5 years.   Recommendations may differ for women with a history of total hysterectomy, cervical cancer, or abnormal pap smears in past. Pap Smear: Not on file    Screening Not Indicated   Hepatitis C Screening Once for adults born between 1945 and 1965  More frequently in patients at  high risk for Hepatitis C Hep C Antibody: Not on file        Diabetes Screening 1-2 times per year if you're at risk for diabetes or have pre-diabetes Fasting glucose: No results in last 5 years (No results in last 5 years)  A1C: No results in last 5 years (No results in last 5 years)  Screening Current   Cholesterol Screening Once every 5 years if you don't have a lipid disorder. May order more often based on risk factors. Lipid panel: Not on file    Screening Not Indicated  History Lipid Disorder     Other Preventive Screenings Covered by Medicare:  Abdominal Aortic Aneurysm (AAA) Screening: covered once if your at risk. You're considered to be at risk if you have a family history of AAA.  Lung Cancer Screening: covers low dose CT scan once per year if you meet all of the following conditions: (1) Age 55-77; (2) No signs or symptoms of lung cancer; (3) Current smoker or have quit smoking within the last 15 years; (4) You have a tobacco smoking history of at least 20 pack years (packs per day multiplied by number of years you smoked); (5) You get a written order from a healthcare provider.  Glaucoma Screening: covered annually if you're considered high risk: (1) You have diabetes OR (2) Family history of glaucoma OR (3)  aged 50 and older OR (4)  American aged 65 and older  Osteoporosis Screening: covered every 2 years if you meet one of the following conditions: (1) You're estrogen deficient and at risk for osteoporosis based off medical history and other findings; (2) Have a vertebral abnormality; (3) On glucocorticoid therapy for more than 3 months; (4) Have primary hyperparathyroidism; (5) On osteoporosis medications and need to assess response to drug therapy.   Last bone density test (DXA Scan): Not on file.  HIV Screening: covered annually if you're between the age of 15-65. Also covered annually if you are younger than 15 and older than 65 with risk factors for HIV infection. For  pregnant patients, it is covered up to 3 times per pregnancy.    Immunizations:  Immunization Recommendations   Influenza Vaccine Annual influenza vaccination during flu season is recommended for all persons aged >= 6 months who do not have contraindications   Pneumococcal Vaccine   * Pneumococcal conjugate vaccine = PCV13 (Prevnar 13), PCV15 (Vaxneuvance), PCV20 (Prevnar 20)  * Pneumococcal polysaccharide vaccine = PPSV23 (Pneumovax) Adults 19-65 yo with certain risk factors or if 65+ yo  If never received any pneumonia vaccine: recommend Prevnar 20 (PCV20)  Give PCV20 if previously received 1 dose of PCV13 or PPSV23   Hepatitis B Vaccine 3 dose series if at intermediate or high risk (ex: diabetes, end stage renal disease, liver disease)   Respiratory syncytial virus (RSV) Vaccine - COVERED BY MEDICARE PART D  * RSVPreF3 (Arexvy) CDC recommends that adults 60 years of age and older may receive a single dose of RSV vaccine using shared clinical decision-making (SCDM)   Tetanus (Td) Vaccine - COST NOT COVERED BY MEDICARE PART B Following completion of primary series, a booster dose should be given every 10 years to maintain immunity against tetanus. Td may also be given as tetanus wound prophylaxis.   Tdap Vaccine - COST NOT COVERED BY MEDICARE PART B Recommended at least once for all adults. For pregnant patients, recommended with each pregnancy.   Shingles Vaccine (Shingrix) - COST NOT COVERED BY MEDICARE PART B  2 shot series recommended in those 19 years and older who have or will have weakened immune systems or those 50 years and older     Health Maintenance Due:  There are no preventive care reminders to display for this patient.  Immunizations Due:      Topic Date Due   • Pneumococcal Vaccine: 65+ Years (1 of 2 - PCV) 08/04/1941   • COVID-19 Vaccine (5 - 2023-24 season) 09/01/2023   • Influenza Vaccine (1) 09/01/2024     Advance Directives   What are advance directives?  Advance directives are legal documents  that state your wishes and plans for medical care. These plans are made ahead of time in case you lose your ability to make decisions for yourself. Advance directives can apply to any medical decision, such as the treatments you want, and if you want to donate organs.   What are the types of advance directives?  There are many types of advance directives, and each state has rules about how to use them. You may choose a combination of any of the following:  Living will:  This is a written record of the treatment you want. You can also choose which treatments you do not want, which to limit, and which to stop at a certain time. This includes surgery, medicine, IV fluid, and tube feedings.   Durable power of  for healthcare (DPAHC):  This is a written record that states who you want to make healthcare choices for you when you are unable to make them for yourself. This person, called a proxy, is usually a family member or a friend. You may choose more than 1 proxy.  Do not resuscitate (DNR) order:  A DNR order is used in case your heart stops beating or you stop breathing. It is a request not to have certain forms of treatment, such as CPR. A DNR order may be included in other types of advance directives.  Medical directive:  This covers the care that you want if you are in a coma, near death, or unable to make decisions for yourself. You can list the treatments you want for each condition. Treatment may include pain medicine, surgery, blood transfusions, dialysis, IV or tube feedings, and a ventilator (breathing machine).  Values history:  This document has questions about your views, beliefs, and how you feel and think about life. This information can help others choose the care that you would choose.  Why are advance directives important?  An advance directive helps you control your care. Although spoken wishes may be used, it is better to have your wishes written down. Spoken wishes can be misunderstood, or  not followed. Treatments may be given even if you do not want them. An advance directive may make it easier for your family to make difficult choices about your care.   Fall Prevention    Fall prevention  includes ways to make your home and other areas safer. It also includes ways you can move more carefully to prevent a fall. Health conditions that cause changes in your blood pressure, vision, or muscle strength and coordination may increase your risk for falls. Medicines may also increase your risk for falls if they make you dizzy, weak, or sleepy.   Fall prevention tips:   Stand or sit up slowly.    Use assistive devices as directed.    Wear shoes that fit well and have soles that .    Wear a personal alarm.    Stay active.    Manage your medical conditions.    Home Safety Tips:  Add items to prevent falls in the bathroom.    Keep paths clear.    Install bright lights in your home.    Keep items you use often on shelves within reach.    Paint or place reflective tape on the edges of your stairs.    Urinary Incontinence   Urinary incontinence (UI)  is when you lose control of your bladder. UI develops because your bladder cannot store or empty urine properly. The 3 most common types of UI are stress incontinence, urge incontinence, or both.  Medicines:   May be given to help strengthen your bladder control. Report any side effects of medication to your healthcare provider.  Do pelvic muscle exercises often:  Your pelvic muscles help you stop urinating. Squeeze these muscles tight for 5 seconds, then relax for 5 seconds. Gradually work up to squeezing for 10 seconds. Do 3 sets of 15 repetitions a day, or as directed. This will help strengthen your pelvic muscles and improve bladder control.  Train your bladder:  Go to the bathroom at set times, such as every 2 hours, even if you do not feel the urge to go. You can also try to hold your urine when you feel the urge to go. For example, hold your urine for 5  minutes when you feel the urge to go. As that becomes easier, hold your urine for 10 minutes.   Self-care:   Keep a UI record.  Write down how often you leak urine and how much you leak. Make a note of what you were doing when you leaked urine.  Drink liquids as directed. You may need to limit the amount of liquid you drink to help control your urine leakage. Do not drink any liquid right before you go to bed. Limit or do not have drinks that contain caffeine or alcohol.   Prevent constipation.  Eat a variety of high-fiber foods. Good examples are high-fiber cereals, beans, vegetables, and whole-grain breads. Walking is the best way to trigger your intestines to have a bowel movement.  Exercise regularly and maintain a healthy weight.  Weight loss and exercise will decrease pressure on your bladder and help you control your leakage.   Use a catheter as directed  to help empty your bladder. A catheter is a tiny, plastic tube that is put into your bladder to drain your urine.   Go to behavior therapy as directed.  Behavior therapy may be used to help you learn to control your urge to urinate.    Weight Management   Why it is important to manage your weight:  Being overweight increases your risk of health conditions such as heart disease, high blood pressure, type 2 diabetes, and certain types of cancer. It can also increase your risk for osteoarthritis, sleep apnea, and other respiratory problems. Aim for a slow, steady weight loss. Even a small amount of weight loss can lower your risk of health problems.  How to lose weight safely:  A safe and healthy way to lose weight is to eat fewer calories and get regular exercise. You can lose up about 1 pound a week by decreasing the number of calories you eat by 500 calories each day.   Healthy meal plan for weight management:  A healthy meal plan includes a variety of foods, contains fewer calories, and helps you stay healthy. A healthy meal plan includes the  following:  Eat whole-grain foods more often.  A healthy meal plan should contain fiber. Fiber is the part of grains, fruits, and vegetables that is not broken down by your body. Whole-grain foods are healthy and provide extra fiber in your diet. Some examples of whole-grain foods are whole-wheat breads and pastas, oatmeal, brown rice, and bulgur.  Eat a variety of vegetables every day.  Include dark, leafy greens such as spinach, kale, sabrina greens, and mustard greens. Eat yellow and orange vegetables such as carrots, sweet potatoes, and winter squash.   Eat a variety of fruits every day.  Choose fresh or canned fruit (canned in its own juice or light syrup) instead of juice. Fruit juice has very little or no fiber.  Eat low-fat dairy foods.  Drink fat-free (skim) milk or 1% milk. Eat fat-free yogurt and low-fat cottage cheese. Try low-fat cheeses such as mozzarella and other reduced-fat cheeses.  Choose meat and other protein foods that are low in fat.  Choose beans or other legumes such as split peas or lentils. Choose fish, skinless poultry (chicken or turkey), or lean cuts of red meat (beef or pork). Before you cook meat or poultry, cut off any visible fat.   Use less fat and oil.  Try baking foods instead of frying them. Add less fat, such as margarine, sour cream, regular salad dressing and mayonnaise to foods. Eat fewer high-fat foods. Some examples of high-fat foods include french fries, doughnuts, ice cream, and cakes.  Eat fewer sweets.  Limit foods and drinks that are high in sugar. This includes candy, cookies, regular soda, and sweetened drinks.  Exercise:  Exercise at least 30 minutes per day on most days of the week. Some examples of exercise include walking, biking, dancing, and swimming. You can also fit in more physical activity by taking the stairs instead of the elevator or parking farther away from stores. Ask your healthcare provider about the best exercise plan for you.      © Copyright [employer] 2018 Information is for End User's use only and may not be sold, redistributed or otherwise used for commercial purposes. All illustrations and images included in CareNotes® are the copyrighted property of [employer] Inc. or [employer]      Patient Education     Urinary incontinence in females   The Basics   Written by the doctors and editors at [facility]   What is urinary incontinence? -- Urinary incontinence is the medical term for when a person leaks urine or loses bladder control.  Incontinence is a very common problem, but it is not a normal part of aging. If you have this problem, there are treatments that can help. There are also things that you can do on your own to stop or reduce urine leakage so you don't have to \"just live with it.\"  What are the symptoms of incontinence? -- There are different types of incontinence. Each causes different symptoms. The 3 most common types are:   Stress incontinence - With stress incontinence, you leak urine when you laugh, cough, sneeze, or do anything that \"stresses\" the belly. Stress incontinence is most common in females, especially those who have had a baby.   Urgency incontinence - With urgency incontinence, you feel a strong need to urinate all of a sudden. This is also known as \"urge incontinence.\" Often, the \"urge\" is so strong that you can't make it to the bathroom in time. \"Overactive bladder\" is another term for having a sudden, frequent urge to urinate. People with overactive bladder might or might not actually leak urine.   Mixed incontinence - With mixed incontinence, you have symptoms of both stress and urgency incontinence.  Is there anything I can do on my own to feel better? -- Yes. Here are some things that can help reduce urine leaks:   Reduce the amount of liquid that you drink, especially a few hours before bed.   Cut down on any foods or drinks that make your symptoms worse. Some people find that alcohol, caffeine, or spicy or " "acidic foods irritate the bladder.   Try to lose weight, if you are overweight. Your doctor or nurse can help you do this in a healthy way.   If you have diabetes, keep your blood sugar as close to your goal level as possible.   If you take medicines called diuretics, plan ahead. These medicines increase the need to urinate. Try to take them when you know you will be near a bathroom for a few hours. If you keep having problems with leakage because of diuretics, ask your doctor if you can take a lower dose or switch to a different medicine.  These techniques can also help improve bladder control:   Bladder retraining - During bladder retraining, you go to the bathroom at scheduled times. For instance, you might decide that you will go every hour. Make yourself go every hour, even if you don't feel like you need to. Try to wait the whole hour, even if you need to go sooner. Then, once you get used to going every hour, increase the amount of time you wait in between bathroom visits. Over time, you might be able to \"retrain\" your bladder to wait 3 or 4 hours between bathroom visits.   Pelvic floor muscle training - This involves learning exercises to strengthen and relax your pelvic muscles. These include the muscles that control the flow of urine and bowel movements. When done right, these exercises can help. But people often do them wrong. Ask your doctor or nurse how to do them right. Your doctor might suggest working with a physical therapist who has special training in these exercises.  Should I see my doctor or nurse? -- Yes. Your doctor or nurse can find out what might be causing your incontinence. They can also suggest ways to relieve the problem.  When you speak to your doctor or nurse, ask if any of the medicines you take could be causing your symptoms. Some medicines can cause incontinence or make it worse.  Some people choose to wear pads or special underwear. These can help if you accidentally leak urine " once in a while. But they can also cause skin irritation if you use them a lot. If you have incontinence, ask your doctor or nurse how to treat it.  How is incontinence treated? -- The treatment options differ depending on what type of incontinence you have. Some of the options include:   Medicines to relax the bladder   Surgery to repair the tissues that support the bladder or to improve the flow of urine   Electrical stimulation of the nerves that relax the bladder  Urinary incontinence is more common in people who have been through menopause. (Menopause is when you stop having monthly periods). Some people have vaginal dryness after menopause. If this is the case for you, a treatment called vaginal estrogen might help.  What will my life be like? -- Many people with incontinence can regain bladder control or at least reduce the amount of leakage they have. The most important thing is to tell your doctor or nurse. Then, work with them to find an approach that helps you.  All topics are updated as new evidence becomes available and our peer review process is complete.  This topic retrieved from [employer] on: Feb 26, 2024.  Topic 03904 Version 18.0  Release: 32.2.4 - C32.56  © 2024 UpToDate, Inc. and/or its affiliates. All rights reserved.  figure 1: Location of the bladder     This drawing shows the side view of a woman's body. The bladder is in front of the vagina. The urethra is the tube that carries urine from the bladder out of the body.  Graphic 787858 Version 1.0  Consumer Information Use and Disclaimer   Disclaimer: This generalized information is a limited summary of diagnosis, treatment, and/or medication information. It is not meant to be comprehensive and should be used as a tool to help the user understand and/or assess potential diagnostic and treatment options. It does NOT include all information about conditions, treatments, medications, side effects, or risks that may apply to a specific patient. It  is not intended to be medical advice or a substitute for the medical advice, diagnosis, or treatment of a health care provider based on the health care provider's examination and assessment of a patient's specific and unique circumstances. Patients must speak with a health care provider for complete information about their health, medical questions, and treatment options, including any risks or benefits regarding use of medications. This information does not endorse any treatments or medications as safe, effective, or approved for treating a specific patient. UpToDate, Inc. and its affiliates disclaim any warranty or liability relating to this information or the use thereof.The use of this information is governed by the Terms of Use, available at https://www.woltersKeyLemonuwer.com/en/know/clinical-effectiveness-terms. 2024© UpToDate, Inc. and its affiliates and/or licensors. All rights reserved.  Copyright   © 2024 UpToDate, Inc. and/or its affiliates. All rights reserved.

## 2024-08-08 NOTE — PROGRESS NOTES
Ambulatory Visit  Name: Alea Lovelace      : 1935      MRN: [id number]  Encounter Provider: MARK Iyer  Encounter Date: 2024   Encounter department: [facility]    Assessment & Plan   1. Medicare annual wellness visit, subsequent  Heart healthy, carbohydrate controlled diet- limit red meat, limit saturated fat, moderate salt intake, limit junk food, etc.   Regular exercise  Stress management  Routine labwork and screenings as ordered.       2. Urinary incontinence, unspecified type  Management strategies reviewed.       Urinary Incontinence Plan of Care: counseling topics discussed: use restroom every 2 hours, limit alcohol, caffeine, spicy foods, and acidic foods, limiting fluid intake 3-4 hours before bed and preventing constipation.       Preventive health issues were discussed with patient, and age appropriate screening tests were ordered as noted in patient's After Visit Summary. Personalized health advice and appropriate referrals for health education or preventive services given if needed, as noted in patient's After Visit Summary.    History of Present Illness     Here for AWV  Feels okay  Recent uti. Saw urology. Follows with Dr. Al. Treated with abx. Resolved.   Taking all meds as prescribed.   Follows with cardio , Dr. Sim. Has appt in September.          Patient Care Team:  MARK Iyer as PCP - General (Family Medicine)    Review of Systems   Constitutional:  Negative for activity change, appetite change and unexpected weight change.   Respiratory:  Negative for chest tightness and shortness of breath.    Cardiovascular:  Negative for chest pain, palpitations and leg swelling.   Gastrointestinal:  Negative for abdominal pain.   Musculoskeletal:  Positive for gait problem (uses cane).   Skin:  Negative for rash and wound.   Allergic/Immunologic: Negative for immunocompromised state.   Neurological:  Negative for dizziness and headaches.    Psychiatric/Behavioral:  Negative for dysphoric mood. The patient is not nervous/anxious.      Medical History Reviewed by provider this encounter:       Annual Wellness Visit Questionnaire   Alea is here for her Subsequent Wellness visit.     Health Risk Assessment:   Patient rates overall health as good. Patient feels that their physical health rating is same. Patient is satisfied with their life. Eyesight was rated as same. Hearing was rated as same. Patient feels that their emotional and mental health rating is same. Patients states they are never, rarely angry. Patient states they are sometimes unusually tired/fatigued. Pain experienced in the last 7 days has been some. Patient's pain rating has been 5/10. Patient states that she has experienced no weight loss or gain in last 6 months.     Depression Screening:   PHQ-2 Score: 0      Fall Risk Screening:   In the past year, patient has experienced: history of falling in past year    Number of falls: 2 or more  Injured during fall?: No    Feels unsteady when standing or walking?: Yes    Worried about falling?: Yes      Urinary Incontinence Screening:   Patient has leaked urine accidently in the last six months. Usually only the hours after taking my Lasix    Home Safety:  Patient does not have trouble with stairs inside or outside of their home. Patient has working smoke alarms and has working carbon monoxide detector. Home safety hazards include: none.     Nutrition:   Current diet is Low Cholesterol and Limited junk food.     Medications:   Patient is not currently taking any over-the-counter supplements. Patient is able to manage medications.     Activities of Daily Living (ADLs)/Instrumental Activities of Daily Living (IADLs):   Walk and transfer into and out of bed and chair?: Yes  Dress and groom yourself?: Yes    Bathe or shower yourself?: Yes    Feed yourself? Yes  Do your laundry/housekeeping?: Yes  Manage your money, pay your bills and track your  expenses?: Yes  Make your own meals?: Yes    Do your own shopping?: Yes    ADL comments: Someone does my grocery shopping    Previous Hospitalizations:   Any hospitalizations or ED visits within the last 12 months?: No      Hospitalization Comments: Was not admitted after a fall and going to ER    Advance Care Planning:   Living will: Yes    Durable POA for healthcare: Yes    Advanced directive: Yes      Cognitive Screening:   Provider or family/friend/caregiver concerned regarding cognition?: No    PREVENTIVE SCREENINGS      Cardiovascular Screening:    General: History Lipid Disorder and Risks and Benefits Discussed      Diabetes Screening:     General: Screening Current and Risks and Benefits Discussed      Colorectal Cancer Screening:     General: Screening Not Indicated      Breast Cancer Screening:     General: Screening Not Indicated      Cervical Cancer Screening:    General: Screening Not Indicated      Osteoporosis Screening:    General: Screening Not Indicated      Abdominal Aortic Aneurysm (AAA) Screening:        General: Screening Not Indicated      Lung Cancer Screening:     General: Screening Not Indicated      Hepatitis C Screening:    General: Screening Not Indicated      Preventive Screening Comments: Recommended immunizations reviewed, risks/benefits discussed. Pt verbalized understanding.         Screening, Brief Intervention, and Referral to Treatment (SBIRT)    Screening  Typical number of drinks in a day: 0  Typical number of drinks in a week: 0  Interpretation: Low risk drinking behavior.    Single Item Drug Screening:  How often have you used an illegal drug (including marijuana) or a prescription medication for non-medical reasons in the past year? never    Single Item Drug Screen Score: 0  Interpretation: Negative screen for possible drug use disorder    Brief Intervention  Alcohol & drug use screenings were reviewed. No concerns regarding substance use disorder identified.     Social  "Determinants of Health     Financial Resource Strain: Low Risk  (8/7/2023)    [facility]    Overall Financial Resource Strain (CARDIA)     Difficulty of Paying Living Expenses: Not hard at all   Food Insecurity: No Food Insecurity (8/1/2024)    Hunger Vital Sign     Worried About Running Out of Food in the Last Year: Never true     Ran Out of Food in the Last Year: Never true   Transportation Needs: No Transportation Needs (8/1/2024)    PRAPARE - Transportation     Lack of Transportation (Medical): No     Lack of Transportation (Non-Medical): No   Housing Stability: Low Risk  (8/1/2024)    Housing Stability Vital Sign     Unable to Pay for Housing in the Last Year: No     Number of Times Moved in the Last Year: 0     Homeless in the Last Year: No   Utilities: Not At Risk (8/1/2024)    [facility] Utilities     Threatened with loss of utilities: No     BMI Counseling: Body mass index is 25.4 kg/m². The BMI is above normal. Nutrition recommendations include reducing intake of saturated fat and trans fat and reducing intake of cholesterol.  Depression Screening Follow-up Plan: Patient's depression screening was negative with a PHQ-2 score of 0. Clinically patient does not have depression. No treatment is required.  Falls Plan of Care: Balance, strength, and gait training instructions were provided.      Objective     /72   Pulse 81   Temp (!) 96.4 °F (35.8 °C)   Ht 4' 8.3\" (1.43 m)   Wt 51.9 kg (114 lb 8 oz)   BMI 25.40 kg/m²     Physical Exam  Vitals reviewed.   Constitutional:       General: She is not in acute distress.     Appearance: She is not ill-appearing.   Neck:      Vascular: No carotid bruit.   Cardiovascular:      Rate and Rhythm: Normal rate. Rhythm irregular.   Pulmonary:      Effort: Pulmonary effort is normal. No respiratory distress.      Breath sounds: Normal breath sounds. No wheezing or rales.   Musculoskeletal:      Right lower leg: No edema.      Left lower " leg: No edema.   Skin:     General: Skin is warm and dry.      Coloration: Skin is not jaundiced or pale.   Neurological:      General: No focal deficit present.      Mental Status: She is alert and oriented to person, place, and time.   Psychiatric:         Mood and Affect: Mood normal.         Behavior: Behavior normal.         Thought Content: Thought content normal.         Judgment: Judgment normal.

## 2024-08-09 ENCOUNTER — NURSE TRIAGE (OUTPATIENT)
Age: 89
End: 2024-08-09

## 2024-08-09 DIAGNOSIS — I11.9 HYPERTENSIVE HEART DISEASE WITHOUT HEART FAILURE: Primary | ICD-10-CM

## 2024-08-09 NOTE — TELEPHONE ENCOUNTER
Subjective     Ruben returns for repeat intralesional corticosteroid injections for alopecia areata.  Previously performed 7/12 and 8/16.      He feels there has been modest improvement since last visit.    HTN: not checking readings at home.    Review of Systems   Constitutional, HEENT, cardiovascular, pulmonary, gi and gu systems are negative, except as otherwise noted.      Objective    BP (!) 150/87   Pulse 76   Temp 97.5  F (36.4  C) (Skin)   Wt 93.9 kg (207 lb)   SpO2 98%   BMI 27.31 kg/m      General: Well appearing, NAD  Skin: there is diffuse thinning of the hair and receding hairline.  Within this background, there are several well circumscribed oval areas of patchy hair loss.  The skin is normal and non-scarring and numerous velus hairs are visualized.   Psych: normal mood and affect            PROCEDURE:     The potential risks of the procedure were discussed and understood, including the potential for skin atrophy, telangiectasia, and hypopigmentation.  Verbal consent was obtained.  All questions were answered.  Triamcinolone was diluted in saline to 10 mg/mL concentration.  Using a 30 gauge 1/2 inch needle, 0.1 mL was injected into the affected areas of the dermis at 1 cm intervals with no more than 0.1 mL per site.  A total of 2 mL was injected this session.  The patient tolerated the procedure well.  Anticipatory guidance was given and understood.       Assessment & Plan     Alopecia areata  Improvement.  Repeat procedure as above.  Can perform up to 6 months or until resolution.  Offered dermatology referral again if at any point he would like additional support or second opinion    Essential hypertension  Above goal.  Will monitor at home and recheck at follow up.  - OR ART PRESS WAVEFORM ANALYS CENTRAL ART PRESSURE      Return in about 6 weeks (around 10/26/2021) for Follow Up.    Doug Subramanian MD  [facility]           "Reason for Disposition  • All other mouth symptoms (Exceptions: dry mouth from not drinking enough liquids, chapped lips)    Answer Assessment - Initial Assessment Questions  1. SYMPTOM: \"What's the main symptom you're concerned about?\" (e.g., dry mouth. chapped lips, lump)      Dry mouth  2. ONSET: \"When did the  dry mouth  start?\"      1 month ago  3. PAIN: \"Is there any pain?\" If Yes, ask: \"How bad is it?\" (Scale: 1-10; mild, moderate, severe)      no  4. CAUSE: \"What do you think is causing the symptoms?\"      medications    Protocols used: Mouth Symptoms-ADULT-[place]    "

## 2024-08-09 NOTE — TELEPHONE ENCOUNTER
Received call from pt.  She calls stating she has had dry mouth for 1 month.  She is asking if is caused by any of her cardiac medications.    Please advise.

## 2024-08-12 RX ORDER — ATENOLOL 25 MG/1
12.5 TABLET ORAL DAILY
Qty: 15 TABLET | Refills: 0 | Status: SHIPPED | OUTPATIENT
Start: 2024-08-12

## 2024-08-12 NOTE — TELEPHONE ENCOUNTER
Pt.advised to discontinue the Metoprolol XL and start on Atenol 12.5 mg daily.  Instructed to keep a BP Diary and keep a follow up appt.

## 2024-08-12 NOTE — TELEPHONE ENCOUNTER
If she is feeling her mouth we can try atenolol 12.5 mg daily and discontinue metoprolol XL.  We should also like to know her heart rate and blood pressure.

## 2024-08-13 DIAGNOSIS — E78.1 HYPERTRIGLYCERIDEMIA: ICD-10-CM

## 2024-08-13 RX ORDER — FENOFIBRATE 160 MG/1
160 TABLET ORAL DAILY
Qty: 90 TABLET | Refills: 0 | Status: SHIPPED | OUTPATIENT
Start: 2024-08-13

## 2024-08-13 NOTE — TELEPHONE ENCOUNTER
Reason for call:   [x] Refill   [] Prior Auth  [] Other:     Office:   [x] PCP/Provider -  MARK Iyer   [] Specialty/Provider -     Medication: fenofibrate 160 MG     Dose/Frequency: 1 tab daily / 90 tabs      Pharmacy: [facility] - [place] [facility]     Does the patient have enough for 3 days?   [x] Yes   [] No - Send as HP to POD

## 2024-08-16 ENCOUNTER — OFFICE VISIT (OUTPATIENT)
Dept: FAMILY MEDICINE CLINIC | Facility: CLINIC | Age: 89
End: 2024-08-16
Payer: MEDICARE

## 2024-08-16 VITALS
DIASTOLIC BLOOD PRESSURE: 70 MMHG | BODY MASS INDEX: 25.64 KG/M2 | SYSTOLIC BLOOD PRESSURE: 116 MMHG | TEMPERATURE: 97.1 F | RESPIRATION RATE: 18 BRPM | WEIGHT: 115.6 LBS | HEART RATE: 72 BPM

## 2024-08-16 DIAGNOSIS — M54.50 ACUTE BILATERAL LOW BACK PAIN WITHOUT SCIATICA: ICD-10-CM

## 2024-08-16 DIAGNOSIS — N39.0 URINARY TRACT INFECTION WITHOUT HEMATURIA, SITE UNSPECIFIED: Primary | ICD-10-CM

## 2024-08-16 LAB
SL AMB  POCT GLUCOSE, UA: NORMAL
SL AMB LEUKOCYTE ESTERASE,UA: 500
SL AMB POCT BILIRUBIN,UA: NORMAL
SL AMB POCT BLOOD,UA: NORMAL
SL AMB POCT CLARITY,UA: NORMAL
SL AMB POCT COLOR,UA: YELLOW
SL AMB POCT KETONES,UA: NORMAL
SL AMB POCT NITRITE,UA: NORMAL
SL AMB POCT PH,UA: 7
SL AMB POCT SPECIFIC GRAVITY,UA: 1.01

## 2024-08-16 PROCEDURE — 87186 SC STD MICRODIL/AGAR DIL: CPT | Performed by: NURSE PRACTITIONER

## 2024-08-16 PROCEDURE — 87077 CULTURE AEROBIC IDENTIFY: CPT | Performed by: NURSE PRACTITIONER

## 2024-08-16 PROCEDURE — 87086 URINE CULTURE/COLONY COUNT: CPT | Performed by: NURSE PRACTITIONER

## 2024-08-16 PROCEDURE — 99213 OFFICE O/P EST LOW 20 MIN: CPT | Performed by: NURSE PRACTITIONER

## 2024-08-16 PROCEDURE — 81000 URINALYSIS NONAUTO W/SCOPE: CPT | Performed by: NURSE PRACTITIONER

## 2024-08-16 RX ORDER — SULFAMETHOXAZOLE/TRIMETHOPRIM 800-160 MG
1 TABLET ORAL EVERY 12 HOURS SCHEDULED
Qty: 10 TABLET | Refills: 0 | Status: SHIPPED | OUTPATIENT
Start: 2024-08-16 | End: 2024-08-21

## 2024-08-16 NOTE — PATIENT INSTRUCTIONS
Urine dip done in the office showed a urinary tract infection.   Bacttrim DS 1 tablet twice daily with 5 days.   Will send urine out for culture.   Push fluids  Finish antibiotics as prescribed. Recommend taking antibiotics with food.   Take probiotic while taking antibiotics to minimize gastrointestinal side effects.   Tylenol as  needed.   Can take Baclofen as needed for back pain (muscle relaxer)

## 2024-08-16 NOTE — PROGRESS NOTES
Ambulatory Visit  Name: Alea Lovelace      : 1935      MRN: [id number]  Encounter Provider: MARK Iyer  Encounter Date: 2024   Encounter department: [facility]    Assessment & Plan   1. Acute bilateral low back pain without sciatica  Tylenol. Baclofen prn.   - POCT urine dip non-auto scope- positive uti    2. Urinary tract infection without hematuria, site unspecified  Urine dip done in the office showed a urinary tract infection.   Bacttrim DS 1 tablet twice daily with 5 days.   Will send urine out for culture.   Push fluids  Finish antibiotics as prescribed. Recommend taking antibiotics with food.   Take probiotic while taking antibiotics to minimize gastrointestinal side effects.   Tylenol as  needed.   - Urine culture  - sulfamethoxazole-trimethoprim (BACTRIM DS) 800-160 mg per tablet; Take 1 tablet by mouth every 12 (twelve) hours for 5 days  Dispense: 10 tablet; Refill: 0         History of Present Illness     Here for low back pain  Symptoms for 2 days  No known injury  No radiation of pain into legs. No associated numbness/tingling.   No fever. Pt does have history of recurrent uti. Follows with urology, Dr. Al. Thinks symptoms probably due to uti. Some discomfort with urination at 5am past 2 days.         Review of Systems   Constitutional:  Negative for fever.   Gastrointestinal:  Negative for abdominal distention, abdominal pain and nausea.   Genitourinary:  Positive for dysuria (a couple of times but mostly not). Negative for frequency and hematuria.   Musculoskeletal:  Positive for back pain.   Skin:  Negative for rash.       Objective     /70   Pulse 72   Temp (!) 97.1 °F (36.2 °C)   Resp 18   Wt 52.4 kg (115 lb 9.6 oz)   BMI 25.64 kg/m²     Physical Exam  Vitals reviewed.   Constitutional:       General: She is not in acute distress.  Cardiovascular:      Rate and Rhythm: Normal rate.   Pulmonary:      Effort: Pulmonary effort is  normal.   Abdominal:      General: There is no distension.      Palpations: Abdomen is soft.      Tenderness: There is no right CVA tenderness or left CVA tenderness.   Genitourinary:     Comments: Poct UA: leuk 500, positive nitrites, neg blood  Neurological:      Mental Status: She is alert and oriented to person, place, and time.   Psychiatric:         Mood and Affect: Mood normal.       Administrative Statements

## 2024-08-18 LAB
BACTERIA UR CULT: ABNORMAL
BACTERIA UR CULT: ABNORMAL

## 2024-08-19 DIAGNOSIS — N39.0 URINARY TRACT INFECTION WITHOUT HEMATURIA, SITE UNSPECIFIED: Primary | ICD-10-CM

## 2024-08-19 RX ORDER — MINOCYCLINE HYDROCHLORIDE 100 MG/1
100 TABLET ORAL 2 TIMES DAILY
Qty: 10 TABLET | Refills: 0 | Status: SHIPPED | OUTPATIENT
Start: 2024-08-19 | End: 2024-08-24

## 2024-08-20 ENCOUNTER — TELEPHONE (OUTPATIENT)
Age: 89
End: 2024-08-20

## 2024-08-20 NOTE — TELEPHONE ENCOUNTER
Pt called in stating that her friend was prescribed vagifem suppositories for frequent uti. States that she uses it twice weekly and has worked well. Pt is asking if the same could be prescribed to her to help with pt frequent UTIs. Please advise.

## 2024-08-20 NOTE — TELEPHONE ENCOUNTER
This is something that she needs to discuss with Dr Al. Some urologists do use estrogen suppositories as part of treatment but should discuss with him.

## 2024-09-04 ENCOUNTER — OFFICE VISIT (OUTPATIENT)
Dept: FAMILY MEDICINE CLINIC | Facility: CLINIC | Age: 89
End: 2024-09-04
Payer: MEDICARE

## 2024-09-04 VITALS
WEIGHT: 115.4 LBS | DIASTOLIC BLOOD PRESSURE: 64 MMHG | RESPIRATION RATE: 18 BRPM | BODY MASS INDEX: 25.96 KG/M2 | HEIGHT: 56 IN | HEART RATE: 78 BPM | SYSTOLIC BLOOD PRESSURE: 88 MMHG | TEMPERATURE: 96 F | OXYGEN SATURATION: 99 %

## 2024-09-04 DIAGNOSIS — M54.50 ACUTE LOW BACK PAIN WITHOUT SCIATICA, UNSPECIFIED BACK PAIN LATERALITY: Primary | ICD-10-CM

## 2024-09-04 DIAGNOSIS — R22.0 NASAL SWELLING: ICD-10-CM

## 2024-09-04 DIAGNOSIS — R82.90 UNSPECIFIED ABNORMAL FINDINGS IN URINE: ICD-10-CM

## 2024-09-04 DIAGNOSIS — R68.2 MOUTH DRYNESS: ICD-10-CM

## 2024-09-04 LAB
SL AMB  POCT GLUCOSE, UA: ABNORMAL
SL AMB LEUKOCYTE ESTERASE,UA: 500
SL AMB POCT BILIRUBIN,UA: ABNORMAL
SL AMB POCT BLOOD,UA: 50
SL AMB POCT CLARITY,UA: CLEAR
SL AMB POCT COLOR,UA: YELLOW
SL AMB POCT KETONES,UA: ABNORMAL
SL AMB POCT NITRITE,UA: ABNORMAL
SL AMB POCT PH,UA: 6
SL AMB POCT SPECIFIC GRAVITY,UA: 1.01
SL AMB POCT URINE PROTEIN: ABNORMAL
SL AMB POCT UROBILINOGEN: ABNORMAL

## 2024-09-04 PROCEDURE — 87186 SC STD MICRODIL/AGAR DIL: CPT | Performed by: FAMILY MEDICINE

## 2024-09-04 PROCEDURE — 87077 CULTURE AEROBIC IDENTIFY: CPT | Performed by: FAMILY MEDICINE

## 2024-09-04 PROCEDURE — G2211 COMPLEX E/M VISIT ADD ON: HCPCS | Performed by: FAMILY MEDICINE

## 2024-09-04 PROCEDURE — 99214 OFFICE O/P EST MOD 30 MIN: CPT | Performed by: FAMILY MEDICINE

## 2024-09-04 PROCEDURE — 87086 URINE CULTURE/COLONY COUNT: CPT | Performed by: FAMILY MEDICINE

## 2024-09-04 PROCEDURE — 81003 URINALYSIS AUTO W/O SCOPE: CPT | Performed by: FAMILY MEDICINE

## 2024-09-04 RX ORDER — AZELASTINE 1 MG/ML
1 SPRAY, METERED NASAL 2 TIMES DAILY
Qty: 1 ML | Refills: 0 | Status: SHIPPED | OUTPATIENT
Start: 2024-09-04

## 2024-09-04 RX ORDER — LEVOFLOXACIN 25 MG/ML
SOLUTION ORAL DAILY
COMMUNITY

## 2024-09-04 NOTE — PROGRESS NOTES
"Alea Lovelace 1935 female MRN: [id number]    [facility] PRACTICE OFFICE VISIT  [facility] - [facility]      ASSESSMENT/PLAN  Alea Lovelace is a 89 y.o. female presents to the office for    Diagnoses and all orders for this visit:    Acute low back pain without sciatica, unspecified back pain laterality  -     POCT urine dip auto non-scope  -     Urine culture    Unspecified abnormal findings in urine  -     Urine culture    Nasal swelling  -     azelastine (ASTELIN) 0.1 % nasal spray; 1 spray into each nostril 2 (two) times a day Use in each nostril as directed    Mouth dryness    Other orders  -     levofloxacin (LEVAQUIN) 25 mg/mL solution; Take by mouth daily       Switch to Allergra short term to see if \"dryness\" improves  Risk of HIVES returning, if it does restart Xyzal.  Acute lower back pain seems to be improving with home exercises.  Urine normal with some cloudiness recommend a urine culture to be obtained.  Congestions likely secondary to allergies recommend nasal spray shown above  Recommend monitoring blood pressure if it continues to be low to please notify our office         Future Appointments   Date Time Provider Department Center   9/5/2024  1:40 PM MD DESTINEY Ferris [place] [facility]   9/17/2024 11:30 AM MARK Iyer  [facility]   2/10/2025 10:30 AM MARK Iyer  [facility]          SUBJECTIVE  CC: Back Pain      HPI:  Alea Lovelace is a 89 y.o. female who presents for an acute appointment. NOT here for a UTI. Dry tongue,every morning. Thought it was secondary dry heat.  Thought she had sciatica for 2 days , fell asleep in an awkward position, started her own PT and improved.   Stuffy nose every night before bed. Congestion has been noticed by her daugher.  Believes that she has a lot of productive phlegm does not feel that she is sick.  Has had this for multiple years  Monitors her blood pressure " daily never had her blood pressure is low it is today.  Scheduled to see her cardiologist  Review of Systems   Constitutional:  Negative for activity change, appetite change, chills, fatigue and fever.   HENT:  Positive for congestion.    Respiratory:  Negative for cough, chest tightness and shortness of breath.    Cardiovascular:  Negative for chest pain and leg swelling.   Gastrointestinal:  Negative for abdominal distention, abdominal pain, constipation, diarrhea, nausea and vomiting.   Musculoskeletal:  Positive for back pain.   All other systems reviewed and are negative.      Historical Information   The patient history was reviewed as follows:  Past Medical History:   Diagnosis Date   • A-fib (HCC)     from prior records   • Arthritis 2018   • Cerebral aneurysm, nonruptured 01/23/2024   • COPD (chronic obstructive pulmonary disease) ([facility])    • Heart disease 2016   • Hyperlipidemia    • MR (mitral regurgitation)    • Osteoarthritis 2020   • Pulmonary hypertension ([facility])    • Stroke ([facility])     Sister   • Urinary tract infection          Medications:     Current Outpatient Medications:   •  apixaban (ELIQUIS) 2.5 mg, Take 1 tablet (2.5 mg total) by mouth 2 (two) times a day, Disp: 180 tablet, Rfl: 1  •  atenolol (TENORMIN) 25 mg tablet, Take 0.5 tablets (12.5 mg total) by mouth daily, Disp: 15 tablet, Rfl: 0  •  azelastine (ASTELIN) 0.1 % nasal spray, 1 spray into each nostril 2 (two) times a day Use in each nostril as directed, Disp: 1 mL, Rfl: 0  •  Biotin 1 MG CAPS, Use daily, Disp: , Rfl:   •  Cholecalciferol (Vitamin D3) 1.25 MG (08341 UT) TABS, Take by mouth, Disp: , Rfl:   •  fenofibrate 160 MG tablet, Take 1 tablet (160 mg total) by mouth daily, Disp: 90 tablet, Rfl: 0  •  furosemide (LASIX) 20 mg tablet, Take 1 tablet (20 mg total) by mouth in the morning, Disp: 90 tablet, Rfl: 1  •  GNP CRANBERRY EXTRACT PO, Take 600 mg by mouth, Disp: , Rfl:   •  levocetirizine (XYZAL) 5 MG tablet, Take 1 tablet (5 mg  "total) by mouth every evening, Disp: 90 tablet, Rfl: 1  •  levofloxacin (LEVAQUIN) 25 mg/mL solution, Take by mouth daily, Disp: , Rfl:   •  Multiple Vitamin (MULTIVITAMIN ADULT PO), Take 1 tablet by mouth daily, Disp: , Rfl:   •  timolol (TIMOPTIC) 0.5 % ophthalmic solution, , Disp: , Rfl:     Allergies   Allergen Reactions   • Nitrofurantoin Other (See Comments)   • Tramadol Other (See Comments)       OBJECTIVE  Vitals:   Vitals:    09/04/24 1358   BP: (!) 88/64   BP Location: Left arm   Patient Position: Sitting   Cuff Size: Standard   Pulse: 78   Resp: 18   Temp: (!) 96 °F (35.6 °C)   SpO2: 99%   Weight: 52.3 kg (115 lb 6.4 oz)   Height: 4' 8\" (1.422 m)         Physical Exam  Vitals reviewed.   Constitutional:       Appearance: She is well-developed.   HENT:      Head: Normocephalic and atraumatic.      Right Ear: No middle ear effusion.      Left Ear:  No middle ear effusion.      Nose: Mucosal edema present.   Eyes:      Extraocular Movements: EOM normal.      Conjunctiva/sclera: Conjunctivae normal.      Pupils: Pupils are equal, round, and reactive to light.   Cardiovascular:      Rate and Rhythm: Normal rate and regular rhythm.      Heart sounds: Normal heart sounds, S1 normal and S2 normal. No murmur heard.  Pulmonary:      Effort: Pulmonary effort is normal. No respiratory distress.      Breath sounds: Normal breath sounds. No wheezing.   Musculoskeletal:         General: No edema. Normal range of motion.      Cervical back: Normal range of motion and neck supple.   Skin:     General: Skin is warm.   Neurological:      Mental Status: She is alert and oriented to person, place, and time.   Psychiatric:         Mood and Affect: Mood and affect normal.         Speech: Speech normal.         Behavior: Behavior normal.         Thought Content: Thought content normal.         Judgment: Judgment normal.                    Tricia Alcala MD,   [facility]  9/4/2024      "

## 2024-09-05 ENCOUNTER — TELEPHONE (OUTPATIENT)
Dept: FAMILY MEDICINE CLINIC | Facility: CLINIC | Age: 89
End: 2024-09-05

## 2024-09-05 DIAGNOSIS — N39.0 URINARY TRACT INFECTION WITHOUT HEMATURIA, SITE UNSPECIFIED: Primary | ICD-10-CM

## 2024-09-05 RX ORDER — LEVOFLOXACIN 500 MG/1
500 TABLET, FILM COATED ORAL EVERY 24 HOURS
Qty: 7 TABLET | Refills: 0 | Status: SHIPPED | OUTPATIENT
Start: 2024-09-05 | End: 2024-09-05

## 2024-09-05 NOTE — TELEPHONE ENCOUNTER
----- Message from Tricia Borges MD sent at 9/5/2024  6:53 PM EDT -----  Please call patient advise her that I need to start her on Levaquin given that the urine showed positive gram-negative meaning she has an infection.  She was placed on the same medication in November.  We will contact her once the full culture return  s

## 2024-09-05 NOTE — TELEPHONE ENCOUNTER
Patient stated that she is currently on her last day of Levaquin. She had dropped a urine off with another doctor's office who prescribed her this medication. Please advise if you would still like her on this medication. Patient is not experiencing symptoms.

## 2024-09-06 ENCOUNTER — TELEPHONE (OUTPATIENT)
Dept: CARDIOLOGY CLINIC | Facility: CLINIC | Age: 89
End: 2024-09-06

## 2024-09-06 LAB — BACTERIA UR CULT: ABNORMAL

## 2024-09-06 NOTE — TELEPHONE ENCOUNTER
Sorry for the confusion; I was going to call in a prescription but we called her last night and she said that another doctor put in the prescription that she was originally taking the urinary tract infection.  And she dropped off a new sample at his office.  So I believe that she should follow-up with wherever she dropped off that sample at.    As for Allegra take every morning

## 2024-09-06 NOTE — TELEPHONE ENCOUNTER
Dr Sim's pt-  She missed her appt yesterday.  Blood pressure log scanned into chart.  BP's are running low but she feels fine.  Would like to come in and have it checked by nurse and bring her machine to be checked.

## 2024-09-06 NOTE — TELEPHONE ENCOUNTER
Patient returned call gave message and states is seeing Dr Sultana on Tuesday and will discuss UTI and medication at that time

## 2024-09-06 NOTE — TELEPHONE ENCOUNTER
Patient called gave Message of preliminary culture results, patient would like to know if medication is going to be sent in. [facility]. Also would like to know if Allegra is to be taken in the morning or in evening.

## 2024-09-06 NOTE — TELEPHONE ENCOUNTER
Tricia Borges MD  9/5/2024  9:53 PM EDT Back to Top      No need to start new medication. If culture does return positive then recommend restarting on an Abx    Tricia Borges MD  9/5/2024  6:53 PM EDT       Please call patient advise her that I need to start her on Levaquin given that the urine showed positive gram-negative meaning she has an infection.  She was placed on the same medication in November.  We will contact her once the full culture returns       Patient Communication

## 2024-09-09 ENCOUNTER — RA CDI HCC (OUTPATIENT)
Dept: OTHER | Facility: HOSPITAL | Age: 89
End: 2024-09-09

## 2024-09-12 ENCOUNTER — CLINICAL SUPPORT (OUTPATIENT)
Dept: CARDIOLOGY CLINIC | Facility: CLINIC | Age: 89
End: 2024-09-12
Payer: MEDICARE

## 2024-09-12 ENCOUNTER — TELEPHONE (OUTPATIENT)
Age: 89
End: 2024-09-12

## 2024-09-12 VITALS
OXYGEN SATURATION: 98 % | BODY MASS INDEX: 26.54 KG/M2 | HEART RATE: 68 BPM | SYSTOLIC BLOOD PRESSURE: 106 MMHG | DIASTOLIC BLOOD PRESSURE: 70 MMHG | WEIGHT: 118 LBS | HEIGHT: 56 IN

## 2024-09-12 DIAGNOSIS — I48.0 PAROXYSMAL ATRIAL FIBRILLATION (HCC): Primary | ICD-10-CM

## 2024-09-12 PROCEDURE — 99211 OFF/OP EST MAY X REQ PHY/QHP: CPT

## 2024-09-12 NOTE — PROGRESS NOTES
Pt came in for BP check after switching from metoprolol to atenolol. Pt states she feels fine, denies lightheadedness, SOB, chest pain and fatigue. Per Karol, pt will continue current medications.    We checked pts home bp machine, it was inaccurate. Advised pt to change the batteries in her current machine and see if that makes a difference or get a new machine. Pt agreeable.

## 2024-09-12 NOTE — TELEPHONE ENCOUNTER
Pt called to say the azelastine nasal spray prescribed on 9/4/24 is not helping. Pt states her congestion in the morning is actually worse. Please advise, leave a detailed message if we get her vm.

## 2024-09-13 NOTE — TELEPHONE ENCOUNTER
Please call pt and advise symptoms are most likely secondary to allergies , especially symptoms seem to be worse in the am.   Looks like she is taking Xzyal daily . Can stop Xyzal and try something else such as generic claritin or zyrtec.   Should be taking the allergy medication every single day to try to treat symptoms but can take at least 2 weeks of daily use to see a difference.   Also, as weather changes, symptoms may change.   She can run a humidifier and that may help. Nasal saline may help .   If she is not running fevers and having any new or accompanying symptoms, does not sound concerning or requiring any abx treatment.

## 2024-09-16 ENCOUNTER — TELEPHONE (OUTPATIENT)
Dept: ADMINISTRATIVE | Facility: OTHER | Age: 89
End: 2024-09-16

## 2024-09-16 NOTE — TELEPHONE ENCOUNTER
09/16/24 11:35 AM    Patient contacted to bring Advance Directive, POLST, or Living Will document to next scheduled pcp visit.[facility] Department spoke with patient/ caregiver.    Thank you.  Gabino Macedo MA  [facility]

## 2024-09-17 ENCOUNTER — OFFICE VISIT (OUTPATIENT)
Dept: FAMILY MEDICINE CLINIC | Facility: CLINIC | Age: 89
End: 2024-09-17
Payer: MEDICARE

## 2024-09-17 VITALS
BODY MASS INDEX: 26.41 KG/M2 | TEMPERATURE: 97 F | HEART RATE: 68 BPM | DIASTOLIC BLOOD PRESSURE: 76 MMHG | RESPIRATION RATE: 18 BRPM | HEIGHT: 56 IN | SYSTOLIC BLOOD PRESSURE: 100 MMHG | WEIGHT: 117.4 LBS

## 2024-09-17 DIAGNOSIS — J30.2 SEASONAL ALLERGIES: Primary | ICD-10-CM

## 2024-09-17 PROCEDURE — G2211 COMPLEX E/M VISIT ADD ON: HCPCS | Performed by: NURSE PRACTITIONER

## 2024-09-17 PROCEDURE — 99213 OFFICE O/P EST LOW 20 MIN: CPT | Performed by: NURSE PRACTITIONER

## 2024-09-17 RX ORDER — FEXOFENADINE HCL 180 MG/1
180 TABLET ORAL DAILY
Qty: 90 TABLET | Refills: 1 | Status: SHIPPED | OUTPATIENT
Start: 2024-09-17

## 2024-09-17 RX ORDER — TAMSULOSIN HYDROCHLORIDE 0.4 MG/1
CAPSULE ORAL DAILY
COMMUNITY
Start: 2024-09-10

## 2024-09-17 NOTE — PATIENT INSTRUCTIONS
Generic Allegra 180mg daily.   Continue Astelin nasal spray  Nasal saline as needed.   Call or return to office if symptoms worsen or if new symptoms develop.

## 2024-09-17 NOTE — PROGRESS NOTES
"Ambulatory Visit  Name: Alea Lovelace      : 1935      MRN: [id number]  Encounter Provider: MARK Iyer  Encounter Date: 2024   Encounter department: [facility]    Assessment & Plan  Seasonal allergies  Generic Allegra 180mg daily.   Continue Astelin nasal spray  Nasal saline as needed.   Call or return to office if symptoms worsen or if new symptoms develop.   Orders:    fexofenadine (ALLEGRA) 180 MG tablet; Take 1 tablet (180 mg total) by mouth daily       History of Present Illness     Follow up on nasal congestion.   Seen by Dr Alcala on . Nasal congestion thought to be secondary to allergies. Given rx for astelin and advised to change allergy med to from xyzal to allegra.   Had been c/o increased stuffy nose and dryness particularly at bedtime.   Has been using allegra and astelin and thinks has helped. Still stuffy but a little better.   Uses nasal saline during the night if needed.   No cough. No fever. No other issues or concerns.           Review of Systems   Constitutional:  Negative for fever.   HENT:  Positive for congestion.    Respiratory:  Negative for cough, chest tightness and shortness of breath.    Allergic/Immunologic: Positive for environmental allergies.           Objective     /76   Pulse 68   Temp (!) 97 °F (36.1 °C)   Resp 18   Ht 4' 8\" (1.422 m)   Wt 53.3 kg (117 lb 6.4 oz)   BMI 26.32 kg/m²     Physical Exam  Vitals reviewed.   Constitutional:       General: She is not in acute distress.  HENT:      Nose: No congestion.   Pulmonary:      Effort: Pulmonary effort is normal.   Neurological:      Mental Status: She is alert and oriented to person, place, and time.   Psychiatric:         Mood and Affect: Mood normal.         "

## 2024-09-20 ENCOUNTER — TELEPHONE (OUTPATIENT)
Age: 89
End: 2024-09-20

## 2024-09-20 NOTE — TELEPHONE ENCOUNTER
No, I explained to pt that insurance most likely would NOT cover but it was sent to see if they possibly would.   She can buy generic otc allegra (fenofexadine).

## 2024-09-20 NOTE — TELEPHONE ENCOUNTER
Patient called and stated the Allegra was not covered under patients insurance.Patient wanted to know can an alternate medication be prescribed that wouldn't  interfere with the other prescribed medications. [facility]

## 2024-10-01 DIAGNOSIS — I11.9 HYPERTENSIVE HEART DISEASE WITHOUT HEART FAILURE: ICD-10-CM

## 2024-10-01 NOTE — TELEPHONE ENCOUNTER
Reason for call:   [x] Refill   [] Prior Auth  [] Other:     Office:   [] PCP/Provider -   [x] Specialty/Provider - Cardio    Medication: atenolol (TENORMIN) 25 mg tablet     Dose/Frequency: Take 0.5 tablets (12.5 mg total) by mouth daily     Quantity: 45    Pharmacy: [facility] - [place] [address]      Does the patient have enough for 3 days?   [x] Yes   [] No - Send as HP to POD

## 2024-10-02 RX ORDER — ATENOLOL 25 MG/1
12.5 TABLET ORAL DAILY
Qty: 45 TABLET | Refills: 1 | Status: SHIPPED | OUTPATIENT
Start: 2024-10-02

## 2024-10-08 DIAGNOSIS — R22.0 NASAL SWELLING: ICD-10-CM

## 2024-10-09 ENCOUNTER — TELEPHONE (OUTPATIENT)
Dept: FAMILY MEDICINE CLINIC | Facility: CLINIC | Age: 89
End: 2024-10-09

## 2024-10-09 RX ORDER — AZELASTINE 1 MG/ML
SPRAY, METERED NASAL
Qty: 30 ML | Refills: 1 | Status: SHIPPED | OUTPATIENT
Start: 2024-10-09

## 2024-10-09 NOTE — TELEPHONE ENCOUNTER
Called patient to clarify mychart request - she stated she just wanted to let the office know she did receive her COVID shot in Sep 2024      Appointment Request From: Alea Lovelace     With Provider: MARK Iyer [-Primary Care Physician-]     Preferred Date Range: Any date 10/9/2024 or later     Preferred Times: Any     Reason: To address the following health maintenance concerns.  Covid-19 Vaccine     Comments:  Received covid vaccine sept. 2024 at [facility].

## 2024-10-10 NOTE — TELEPHONE ENCOUNTER
Patient stated she doesn't know the date she stated she gave Ruthie a piece of paper that had all her vaccines listed and then gave it back to the patient. Advised patient to give us a call back once she find her vaccinations. NFA needed at this time.

## 2024-10-10 NOTE — TELEPHONE ENCOUNTER
Spoke to patient   She will be calling back with the date she received the Covid vaccine so I can update the chart

## 2024-10-16 ENCOUNTER — TELEPHONE (OUTPATIENT)
Age: 89
End: 2024-10-16

## 2024-10-16 NOTE — TELEPHONE ENCOUNTER
Fariba is calling back to follow up on her question regarding Magnesium Glycinate.     Advised that message was sent to covering provider.   Patient is heading out to a doctors appointment but states if we call - its OK to leave a message.     Please advise, thank you.

## 2024-10-16 NOTE — TELEPHONE ENCOUNTER
Patient called is having trouble sleeping and was told Magnesium Glycinate is helpful. Would like to know if ok for her to take

## 2024-10-18 NOTE — TELEPHONE ENCOUNTER
Please advise the patient that I am okay with her starting this medication.  But do advise her that some patients can get a little loose stool from taking magnesium supplements

## 2024-10-30 DIAGNOSIS — E78.1 HYPERTRIGLYCERIDEMIA: ICD-10-CM

## 2024-10-30 DIAGNOSIS — I48.0 PAROXYSMAL ATRIAL FIBRILLATION (HCC): ICD-10-CM

## 2024-10-30 NOTE — TELEPHONE ENCOUNTER
Reason for call:   [x] Refill   [] Prior Auth  [] Other:     Office:   [x] PCP/Provider - PRIMARY CARE [facility] POD  Authorized By: Tae Sim MD  [] Specialty/Provider -     Medication: apixaban (ELIQUIS) 2.5 mg     Dose/Frequency: Take 1 tablet (2.5 mg total) by mouth 2 (two) times a day     Quantity: 180 tablets     Pharmacy: [facility] [place] [facility] [address]     Does the patient have enough for 3 days?   [x] Yes   [] No - Send as HP to POD

## 2024-10-30 NOTE — TELEPHONE ENCOUNTER
Patient is requesting a year supply.     Reason for call:   [x] Refill   [] Prior Auth  [] Other:     Office:   [x] PCP/Provider -   [] Specialty/Provider -     Medication:   Fenofibrate 160 MG- take 1 tablet by mouth daily      Quantity: 90 days with 3 refills    Pharmacy: [facility]    Does the patient have enough for 3 days?   [x] Yes   [] No - Send as HP to POD

## 2024-10-31 RX ORDER — FENOFIBRATE 160 MG/1
160 TABLET ORAL DAILY
Qty: 90 TABLET | Refills: 3 | Status: SHIPPED | OUTPATIENT
Start: 2024-10-31

## 2024-11-19 NOTE — ASSESSMENT & PLAN NOTE
11/20/24 EKG: Sinus rhythm, 76 bpm.  Cannot rule out anterior infarct, cited on before 7/12/23.   Currently on atenolol 12.5 mg daily.  [id number] stroke risk score: 4 points, moderate to high risk.  Currently on Eliquis 2.5 mg twice daily.

## 2024-11-19 NOTE — PROGRESS NOTES
Progress Note - Cardiology Office  [facility]    Alea Lovelace 89 y.o. female MRN: [id number]  : 1935  Encounter: [id number]      Assessment:     Assessment & Plan  Paroxysmal atrial fibrillation (HCC)  24 EKG: Sinus rhythm, 76 bpm.  Cannot rule out anterior infarct, cited on before 23.   Currently on atenolol 12.5 mg daily.  [id number] stroke risk score: 4 points, moderate to high risk.  Currently on Eliquis 2.5 mg twice daily.   Hypertensive heart disease without heart failure  BP during today's office visit, 100/70.   Currently on atenolol 12.5 mg daily and Lasix 20 mg daily.  3/26/24 TTE: LVEF 60%.  Diastolic function normal for age.  Aortic valve sclerosis.  Mitral valve with mild regurgitation.  Tricuspid valve with mild regurgitation.  The right ventricle systolic pressure is mildly elevated, 54 mmHg.  Pulmonic valve with mild regurgitation.  Cardiac murmur  Patient has documented history of cardiac murmur.  3/26/24 TTE:   Mitral Valve Mitral valve structure is normal.  There is mild regurgitation. There is no evidence of stenosis.   Tricuspid Valve Tricuspid valve structure is normal. There is mild regurgitation. There is no evidence of stenosis. The right ventricular systolic pressure is mildly elevated. The estimated right ventricular systolic pressure is 54.00 mmHg.   Pulmonic Valve Pulmonic valve structure is normal. There is mild regurgitation. There is no evidence of stenosis.     Pulmonary hypertension ([facility])  3/26/24 TTE: The right ventricular systolic pressure is mildly elevated. The estimated right ventricular systolic pressure is 54.00 mmHg.   Chronic obstructive pulmonary disease, unspecified COPD type ([facility])  Care per PCP.  Left renal mass  24 CT abdomen/pelvis with contrast: There is a 1.8 x 1.7 x 1.9 cm solid enhancing mass within the lower pole of the left kidney.   Prior cardiology notes indicate that patient does not want any further  workup.      Discussion Summary and Plan:      Patient / Caretaker was advised and educated to call our office  immediately if  patient has any new symptoms of chest pain/shortness of breath, near-syncope, syncope, light headedness sustained palpitations  or any other cardiovascular symptoms before their scheduled follow-up appointment.  Office number was provided # [phone]  Please call [phone] if any questions.  Counseling :  A description of the counseling.  Goals and Barriers.  Patient's ability to self care: Yes  Medication side effect reviewed with patient in detail and all their questions answered to their satisfaction.    HPI :     Alea Lovelace is a 89 y.o. female with PMHx of paroxysmal atrial fibrillation (on Eliquis), essential hypertension, dyslipidemia, pulmonary hypertension, COPD, left renal mass, who presents for routine outpatient cardiology follow-up.      Patient was last seen outpatient cardiology office on 3/14/24.  Patient reports since last outpatient cardiology office visit she has been doing well.  She offers no complaints.  She denies experiencing chest pain, palpitations, shortness of breath at rest or with exertion, lower extremity edema, orthopnea, weight gain, lightheadedness, dizziness, headache, nausea, vomiting.    Review of Systems   All other systems reviewed and are negative.      Historical Information   Past Medical History:   Diagnosis Date    A-fib (HCC)     from prior records    Arthritis 2018    Cerebral aneurysm, nonruptured 01/23/2024    COPD (chronic obstructive pulmonary disease) (HCC)     Heart disease 2016    Hyperlipidemia     MR (mitral regurgitation)     Osteoarthritis 2020    Pulmonary hypertension (HCC)     Stroke (HCC)     Sister    Urinary tract infection      Past Surgical History:   Procedure Laterality Date    CHOLECYSTECTOMY      HYSTERECTOMY      JOINT REPLACEMENT  2018    SHOULDER SURGERY  2016    TUBAL LIGATION       Social History  "    Substance and Sexual Activity   Alcohol Use Not Currently     Social History     Substance and Sexual Activity   Drug Use Never     Social History     Tobacco Use   Smoking Status Never    Passive exposure: Past   Smokeless Tobacco Never   Tobacco Comments    Father, , mother-in-law all smoked     Family History:   Family History   Problem Relation Age of Onset    Stroke Sister        Meds/Allergies     Allergies   Allergen Reactions    Nitrofurantoin Other (See Comments)    Tramadol Other (See Comments)       Current Outpatient Medications:     apixaban (ELIQUIS) 2.5 mg, Take 1 tablet (2.5 mg total) by mouth 2 (two) times a day, Disp: 180 tablet, Rfl: 0    atenolol (TENORMIN) 25 mg tablet, Take 0.5 tablets (12.5 mg total) by mouth daily, Disp: 45 tablet, Rfl: 1    azelastine (ASTELIN) 0.1 % nasal spray, 1 SPRAY INTO EACH NOSTRIL TWO (TWO) TIMES A DAY USE IN EACH NOSTRIL AS DIRECTED, Disp: 30 mL, Rfl: 1    Biotin 1 MG CAPS, Use daily, Disp: , Rfl:     Cholecalciferol (Vitamin D3) 1.25 MG (94589 UT) TABS, Take by mouth, Disp: , Rfl:     fenofibrate 160 MG tablet, Take 1 tablet (160 mg total) by mouth daily, Disp: 90 tablet, Rfl: 3    fexofenadine (ALLEGRA) 180 MG tablet, Take 1 tablet (180 mg total) by mouth daily, Disp: 90 tablet, Rfl: 1    furosemide (LASIX) 20 mg tablet, Take 1 tablet (20 mg total) by mouth in the morning, Disp: 90 tablet, Rfl: 1    GNP CRANBERRY EXTRACT PO, Take 600 mg by mouth, Disp: , Rfl:     levofloxacin (LEVAQUIN) 25 mg/mL solution, Take by mouth daily, Disp: , Rfl:     Multiple Vitamin (MULTIVITAMIN ADULT PO), Take 1 tablet by mouth daily, Disp: , Rfl:     tamsulosin (FLOMAX) 0.4 mg, in the morning, Disp: , Rfl:     timolol (TIMOPTIC) 0.5 % ophthalmic solution, , Disp: , Rfl:     Vitals: Blood pressure 100/70, pulse 76, height 4' 8\" (1.422 m), weight 52.6 kg (116 lb), SpO2 95%.    Body mass index is 26.01 kg/m².  Wt Readings from Last 3 Encounters:   11/20/24 52.6 kg (116 lb) "   24 53.3 kg (117 lb 6.4 oz)   24 53.5 kg (118 lb)     Vitals:    24 1405   Weight: 52.6 kg (116 lb)     BP Readings from Last 3 Encounters:   24 100/70   24 100/76   24 106/70       Physical Exam:  Physical Exam  Vitals reviewed.   Constitutional:       General: She is not in acute distress.  Cardiovascular:      Rate and Rhythm: Normal rate and regular rhythm.      Pulses: Normal pulses.      Heart sounds: No murmur heard.  Pulmonary:      Effort: Pulmonary effort is normal. No respiratory distress.      Breath sounds: Normal breath sounds.   Abdominal:      General: Abdomen is flat. There is no distension.      Palpations: Abdomen is soft.      Tenderness: There is no abdominal tenderness.   Musculoskeletal:      Right lower leg: No edema.      Left lower leg: No edema.   Skin:     General: Skin is warm and dry.   Neurological:      Mental Status: She is alert and oriented to person, place, and time.         Diagnostic Studies Review Cardio:      EK/20/24 EKG: Sinus rhythm, 76 bpm.  Cannot rule out anterior infarct, cited on before 23.     Cardiac testing:   Echo complete  Result date: 3/26/24    Left Ventricle Left ventricular cavity size is normal. Wall thickness is normal. The left ventricular ejection fraction is 60% by visual estimation. Systolic function is normal. at -22%.  Wall motion is normal. Diastolic function is normal for age.  Left atrial filling pressure is normal.   Right Ventricle Right ventricular cavity size is normal. Systolic function is normal. Wall thickness is normal.   Left Atrium The atrium is normal in size (16-34 mL/m2).   Right Atrium The atrium is normal in size.   Aortic Valve The aortic valve is trileaflet. The leaflets are mildly calcified. The leaflets exhibit normal mobility. There is no evidence of regurgitation. There is aortic valve sclerosis.   Mitral Valve Mitral valve structure is normal.  There is mild regurgitation. There  is no evidence of stenosis.   Tricuspid Valve Tricuspid valve structure is normal. There is mild regurgitation. There is no evidence of stenosis. The right ventricular systolic pressure is mildly elevated. The estimated right ventricular systolic pressure is 54.00 mmHg.   Pulmonic Valve Pulmonic valve structure is normal. There is mild regurgitation. There is no evidence of stenosis.   Ascending Aorta The aortic root is normal in size.   IVC/SVC The right atrial pressure is estimated at 8.0 mmHg. The inferior vena cava is normal in size.   Pericardium There is no pericardial effusion. The pericardium is normal in appearance.       Lab Review   Lab Results   Component Value Date    WBC 8.57 02/16/2024    HGB 12.8 02/16/2024    HCT 38.3 02/16/2024    MCV 95 02/16/2024    RDW 14.2 02/16/2024     02/16/2024     BMP:  Lab Results   Component Value Date    SODIUM 143 02/16/2024    K 3.7 02/16/2024     02/16/2024    CO2 30 02/16/2024    BUN 26 (H) 02/16/2024    CREATININE 0.88 02/16/2024    GLUC 94 02/16/2024    CALCIUM 9.9 02/16/2024    EGFR 59 02/16/2024     LFT:  Lab Results   Component Value Date    AST 34 02/16/2024    ALT 28 02/16/2024    ALKPHOS 22 (L) 02/16/2024    TP 7.0 02/16/2024    ALB 4.0 02/16/2024          Karol Campos PA-C

## 2024-11-19 NOTE — ASSESSMENT & PLAN NOTE
3/26/24 TTE: The right ventricular systolic pressure is mildly elevated. The estimated right ventricular systolic pressure is 54.00 mmHg.

## 2024-11-19 NOTE — ASSESSMENT & PLAN NOTE
Patient has documented history of cardiac murmur.  3/26/24 TTE:   Mitral Valve Mitral valve structure is normal.  There is mild regurgitation. There is no evidence of stenosis.   Tricuspid Valve Tricuspid valve structure is normal. There is mild regurgitation. There is no evidence of stenosis. The right ventricular systolic pressure is mildly elevated. The estimated right ventricular systolic pressure is 54.00 mmHg.   Pulmonic Valve Pulmonic valve structure is normal. There is mild regurgitation. There is no evidence of stenosis.

## 2024-11-20 ENCOUNTER — OFFICE VISIT (OUTPATIENT)
Dept: CARDIOLOGY CLINIC | Facility: CLINIC | Age: 89
End: 2024-11-20
Payer: MEDICARE

## 2024-11-20 VITALS
BODY MASS INDEX: 26.1 KG/M2 | OXYGEN SATURATION: 95 % | HEIGHT: 56 IN | SYSTOLIC BLOOD PRESSURE: 100 MMHG | HEART RATE: 76 BPM | DIASTOLIC BLOOD PRESSURE: 70 MMHG | WEIGHT: 116 LBS

## 2024-11-20 DIAGNOSIS — I27.20 PULMONARY HYPERTENSION (HCC): ICD-10-CM

## 2024-11-20 DIAGNOSIS — I11.9 HYPERTENSIVE HEART DISEASE WITHOUT HEART FAILURE: ICD-10-CM

## 2024-11-20 DIAGNOSIS — I48.0 PAROXYSMAL ATRIAL FIBRILLATION (HCC): Primary | ICD-10-CM

## 2024-11-20 DIAGNOSIS — N28.89 LEFT RENAL MASS: ICD-10-CM

## 2024-11-20 DIAGNOSIS — J44.9 CHRONIC OBSTRUCTIVE PULMONARY DISEASE, UNSPECIFIED COPD TYPE (HCC): ICD-10-CM

## 2024-11-20 DIAGNOSIS — R01.1 CARDIAC MURMUR: ICD-10-CM

## 2024-11-20 PROCEDURE — 99214 OFFICE O/P EST MOD 30 MIN: CPT | Performed by: PHYSICIAN ASSISTANT

## 2024-11-20 PROCEDURE — 93000 ELECTROCARDIOGRAM COMPLETE: CPT | Performed by: PHYSICIAN ASSISTANT

## 2024-12-02 ENCOUNTER — OFFICE VISIT (OUTPATIENT)
Dept: FAMILY MEDICINE CLINIC | Facility: CLINIC | Age: 89
End: 2024-12-02
Payer: MEDICARE

## 2024-12-02 VITALS
HEIGHT: 56 IN | SYSTOLIC BLOOD PRESSURE: 104 MMHG | BODY MASS INDEX: 26.05 KG/M2 | DIASTOLIC BLOOD PRESSURE: 76 MMHG | WEIGHT: 115.8 LBS | RESPIRATION RATE: 18 BRPM | HEART RATE: 80 BPM | TEMPERATURE: 97.3 F | OXYGEN SATURATION: 96 %

## 2024-12-02 DIAGNOSIS — J06.9 VIRAL URI: Primary | ICD-10-CM

## 2024-12-02 DIAGNOSIS — E78.1 HYPERTRIGLYCERIDEMIA: ICD-10-CM

## 2024-12-02 DIAGNOSIS — R05.1 ACUTE COUGH: ICD-10-CM

## 2024-12-02 PROCEDURE — 99214 OFFICE O/P EST MOD 30 MIN: CPT | Performed by: NURSE PRACTITIONER

## 2024-12-02 PROCEDURE — G2211 COMPLEX E/M VISIT ADD ON: HCPCS | Performed by: NURSE PRACTITIONER

## 2024-12-02 RX ORDER — BENZONATATE 200 MG/1
200 CAPSULE ORAL 3 TIMES DAILY PRN
Qty: 20 CAPSULE | Refills: 0 | Status: SHIPPED | OUTPATIENT
Start: 2024-12-02

## 2024-12-02 RX ORDER — PREDNISONE 10 MG/1
10 TABLET ORAL DAILY
Qty: 7 TABLET | Refills: 0 | Status: SHIPPED | OUTPATIENT
Start: 2024-12-02 | End: 2024-12-09

## 2024-12-02 NOTE — PROGRESS NOTES
Name: Alea Lovelace      : 1935      MRN: [id number]  Encounter Provider: MARK Iyer  Encounter Date: 2024   Encounter department: [facility]  :  Assessment & Plan  Viral URI  You have been diagnosed with a viral upper respiratory infection, otherwise known as a common cold.   Fluids. Rest. Nasal saline. Supportive care for symptom management.  Tylenol or ibuprofen as needed for fever or discomfort.  Use a cool mist humidifier at bedtime.   Antibiotics are not indicated at this time.   Prednisone 10mg daily with food for one week  Tessalon perles as needed for cough  Symptoms may persist for up to 14 days.        Acute cough  Orders:    predniSONE 10 mg tablet; Take 1 tablet (10 mg total) by mouth daily for 7 days    benzonatate (TESSALON) 200 MG capsule; Take 1 capsule (200 mg total) by mouth 3 (three) times a day as needed for cough      Patient was counseled regarding instructions for management which included: impression/diagnosis, risk/benefits of treatment options, importance of compliance with treatment, risk factor reduction, and prognosis.   I have reviewed the instructions with the patient answering all questions and patient verbalized understanding.       History of Present Illness     Here for cough. One week symptoms.   Nasal congestion. No fever.   Feels like has had a cold  Normally takes allegra but stopped taking a couple of days ago.   Uses Astelin twice daily.         Cough  Associated symptoms include postnasal drip and a sore throat (from coughing). Pertinent negatives include no ear pain, fever, headaches or myalgias.     Review of Systems   Constitutional:  Negative for fever.   HENT:  Positive for congestion, postnasal drip and sore throat (from coughing). Negative for ear pain, sinus pressure and sinus pain.    Respiratory:  Positive for cough.    Gastrointestinal:  Negative for diarrhea, nausea and vomiting.   Musculoskeletal:   "Negative for myalgias.   Allergic/Immunologic: Negative for immunocompromised state.   Neurological:  Negative for dizziness and headaches.          Objective   /76   Pulse 80   Temp (!) 97.3 °F (36.3 °C)   Resp 18   Ht 4' 8\" (1.422 m)   Wt 52.5 kg (115 lb 12.8 oz)   SpO2 96%   BMI 25.96 kg/m²      Physical Exam  Vitals reviewed.   Constitutional:       General: She is not in acute distress.     Appearance: She is not ill-appearing.   HENT:      Right Ear: Tympanic membrane and ear canal normal.      Left Ear: Tympanic membrane and ear canal normal.      Nose: Congestion present.      Comments: Turbinates inflamed  No sinus pain with palpation.      Mouth/Throat:      Mouth: Mucous membranes are moist.      Pharynx: Oropharynx is clear.   Eyes:      General:         Right eye: No discharge.         Left eye: No discharge.   Cardiovascular:      Rate and Rhythm: Normal rate and regular rhythm.   Pulmonary:      Effort: Pulmonary effort is normal. No respiratory distress.      Breath sounds: Wheezing (faint wheeze left upper lobe, clears with cough) present. No rales.   Musculoskeletal:      Cervical back: Normal range of motion.   Lymphadenopathy:      Cervical: No cervical adenopathy.   Skin:     General: Skin is warm and dry.      Coloration: Skin is not jaundiced or pale.   Neurological:      General: No focal deficit present.      Mental Status: She is alert and oriented to person, place, and time.      Cranial Nerves: No cranial nerve deficit.      Sensory: No sensory deficit.   Psychiatric:         Mood and Affect: Mood normal.         Behavior: Behavior normal.         Thought Content: Thought content normal.         Judgment: Judgment normal.         "

## 2024-12-02 NOTE — PATIENT INSTRUCTIONS
You have been diagnosed with a viral upper respiratory infection, otherwise known as a common cold.   Fluids. Rest. Nasal saline. Supportive care for symptom management.  Tylenol or ibuprofen as needed for fever or discomfort.  Use a cool mist humidifier at bedtime.   Antibiotics are not indicated at this time.   Prednisone 10mg daily with food for one week  Tessalon perles as needed for cough  Symptoms may persist for up to 14 days.

## 2024-12-03 RX ORDER — FENOFIBRATE 160 MG/1
160 TABLET ORAL DAILY
Qty: 90 TABLET | Refills: 3 | Status: SHIPPED | OUTPATIENT
Start: 2024-12-03

## 2025-01-28 ENCOUNTER — TELEPHONE (OUTPATIENT)
Dept: CARDIOLOGY CLINIC | Facility: CLINIC | Age: 89
End: 2025-01-28

## 2025-01-28 DIAGNOSIS — I48.0 PAROXYSMAL ATRIAL FIBRILLATION (HCC): ICD-10-CM

## 2025-01-28 RX ORDER — APIXABAN 2.5 MG/1
2.5 TABLET, FILM COATED ORAL 2 TIMES DAILY
Qty: 180 TABLET | Refills: 1 | Status: SHIPPED | OUTPATIENT
Start: 2025-01-28

## 2025-01-28 NOTE — TELEPHONE ENCOUNTER
Patient called for refill on Eliquis 2.5mg. I created a refill encounter and when completing the medication it stated the medication is already pending approval.

## 2025-02-06 ENCOUNTER — RA CDI HCC (OUTPATIENT)
Dept: OTHER | Facility: HOSPITAL | Age: 89
End: 2025-02-06

## 2025-02-12 ENCOUNTER — TELEPHONE (OUTPATIENT)
Dept: ADMINISTRATIVE | Facility: OTHER | Age: 89
End: 2025-02-12

## 2025-02-12 NOTE — TELEPHONE ENCOUNTER
02/12/25 3:07 PM    Patient contacted to bring Advance Directive, POLST, or Living Will document to next scheduled pcp visit.[facility] Department spoke with patient/ caregiver.    Thank you.  Daphne Stone MA  [facility]

## 2025-02-13 ENCOUNTER — OFFICE VISIT (OUTPATIENT)
Dept: FAMILY MEDICINE CLINIC | Facility: CLINIC | Age: 89
End: 2025-02-13
Payer: MEDICARE

## 2025-02-13 VITALS
WEIGHT: 113.4 LBS | HEIGHT: 56 IN | DIASTOLIC BLOOD PRESSURE: 70 MMHG | RESPIRATION RATE: 18 BRPM | HEART RATE: 66 BPM | TEMPERATURE: 97.1 F | OXYGEN SATURATION: 95 % | BODY MASS INDEX: 25.51 KG/M2 | SYSTOLIC BLOOD PRESSURE: 104 MMHG

## 2025-02-13 DIAGNOSIS — E78.5 DYSLIPIDEMIA: ICD-10-CM

## 2025-02-13 DIAGNOSIS — E78.1 HYPERTRIGLYCERIDEMIA: ICD-10-CM

## 2025-02-13 DIAGNOSIS — I48.0 PAROXYSMAL ATRIAL FIBRILLATION (HCC): ICD-10-CM

## 2025-02-13 DIAGNOSIS — I27.20 PULMONARY HYPERTENSION (HCC): Primary | ICD-10-CM

## 2025-02-13 DIAGNOSIS — Z99.89 USES ROLLER WALKER: ICD-10-CM

## 2025-02-13 DIAGNOSIS — J44.9 CHRONIC OBSTRUCTIVE PULMONARY DISEASE, UNSPECIFIED COPD TYPE (HCC): ICD-10-CM

## 2025-02-13 PROCEDURE — 99214 OFFICE O/P EST MOD 30 MIN: CPT | Performed by: NURSE PRACTITIONER

## 2025-02-13 PROCEDURE — G2211 COMPLEX E/M VISIT ADD ON: HCPCS | Performed by: NURSE PRACTITIONER

## 2025-02-13 NOTE — ASSESSMENT & PLAN NOTE
Fibrate. Heart healthy diet. Fish oil  Orders:    CBC and differential; Future    Comprehensive metabolic panel; Future    Lipid panel; Future

## 2025-02-13 NOTE — ASSESSMENT & PLAN NOTE
Stable. No recent exac. Monitor.   Orders:    CBC and differential; Future    Comprehensive metabolic panel; Future

## 2025-02-13 NOTE — ASSESSMENT & PLAN NOTE
Stable. Managed by cardio. No change to meds/tx. Lasix. Monitor.   Orders:    CBC and differential; Future    Comprehensive metabolic panel; Future

## 2025-02-13 NOTE — ASSESSMENT & PLAN NOTE
Eliquis. Rate controlled. Managed by cardio. Monitor.   Orders:    CBC and differential; Future    Comprehensive metabolic panel; Future

## 2025-02-25 ENCOUNTER — OFFICE VISIT (OUTPATIENT)
Age: 89
End: 2025-02-25
Payer: MEDICARE

## 2025-02-25 VITALS — WEIGHT: 113 LBS | BODY MASS INDEX: 25.42 KG/M2 | HEIGHT: 56 IN | RESPIRATION RATE: 16 BRPM

## 2025-02-25 DIAGNOSIS — I70.209 PERIPHERAL ARTERIOSCLEROSIS (HCC): ICD-10-CM

## 2025-02-25 DIAGNOSIS — M79.672 PAIN IN BOTH FEET: Primary | ICD-10-CM

## 2025-02-25 DIAGNOSIS — B35.1 ONYCHOMYCOSIS: ICD-10-CM

## 2025-02-25 DIAGNOSIS — M79.671 PAIN IN BOTH FEET: Primary | ICD-10-CM

## 2025-02-25 DIAGNOSIS — L03.031 PARONYCHIA OF TOENAIL OF RIGHT FOOT: ICD-10-CM

## 2025-02-25 PROCEDURE — 99202 OFFICE O/P NEW SF 15 MIN: CPT | Performed by: PODIATRIST

## 2025-02-25 NOTE — PROGRESS NOTES
Assessment/Plan: Pain upon ambulation secondary to mycosis of nail and peripheral artery disease.  Paronychia right hallux.    Plan.  Chart reviewed.  PCP notes reviewed.  Patient evaluated.  Patient advised on foot hygiene.  Patient given shoe recommendations.  No cutting instruction offered.  Watch for signs of infection.  Aftercare instruction given.  Spray shoes with Lysol.  Return for follow-up as needed.         Diagnoses and all orders for this visit:    Pain in both feet    Onychomycosis    Peripheral arteriosclerosis (HCC)    Paronychia of toenail of right foot          Subjective: Patient gets pain.  She has pain in her toes ambulation and shoewear.  No history of trauma.  Patient has severe osteoarthritis.    Allergies   Allergen Reactions    Nitrofurantoin Other (See Comments)    Tramadol Other (See Comments)         Current Outpatient Medications:     apixaban (Eliquis) 2.5 mg, TAKE 1 TABLET TWICE A DAY, Disp: 180 tablet, Rfl: 1    atenolol (TENORMIN) 25 mg tablet, Take 0.5 tablets (12.5 mg total) by mouth daily, Disp: 45 tablet, Rfl: 1    fenofibrate 160 MG tablet, Take 1 tablet (160 mg total) by mouth daily, Disp: 90 tablet, Rfl: 3    furosemide (LASIX) 20 mg tablet, Take 1 tablet (20 mg total) by mouth in the morning, Disp: 90 tablet, Rfl: 1    GNP CRANBERRY EXTRACT PO, Take 600 mg by mouth, Disp: , Rfl:     Magnesium Glycinate 120 MG CAPS, , Disp: , Rfl:     Multiple Vitamin (MULTIVITAMIN ADULT PO), Take 1 tablet by mouth daily, Disp: , Rfl:     timolol (TIMOPTIC) 0.5 % ophthalmic solution, , Disp: , Rfl:     Patient Active Problem List   Diagnosis    Paroxysmal atrial fibrillation (HCC)    Dyslipidemia    Chronic obstructive pulmonary disease (HCC)    Pulmonary hypertension (HCC)    Cardiac murmur    Cerebral aneurysm, nonruptured    Glaucoma    Osteoarthritis    Hypertriglyceridemia          Patient ID: Alea Lovelace is a 89 y.o. female.    HPI    The following portions of the patient's  history were reviewed and updated as appropriate:     family history includes Stroke in her sister.      reports that she has never smoked. She has been exposed to tobacco smoke. She has never used smokeless tobacco. She reports that she does not currently use alcohol. She reports that she does not use drugs.    Vitals:    02/25/25 1030   Resp: 16       Review of Systems      Objective:  Patient's shoes and socks removed.   Foot Exam    General  General Appearance: appears stated age and healthy   Orientation: alert and oriented to person, place, and time   Affect: appropriate   Gait: antalgic   Assistance: walker use       Right Foot/Ankle     Inspection and Palpation  Tenderness: metatarsals   Swelling: dorsum   Arch: pes cavus  Hammertoes: fifth toe  Skin Exam: dry skin;     Neurovascular  Dorsalis pedis: 2+  Posterior tibial: 1+      Left Foot/Ankle      Inspection and Palpation  Tenderness: metatarsals   Swelling: dorsum   Arch: pes cavus  Hammertoes: fifth toe  Skin Exam: dry skin;     Neurovascular  Dorsalis pedis: 2+  Posterior tibial: 1+      Physical Exam  Vitals and nursing note reviewed.   Constitutional:       Appearance: Normal appearance.   Cardiovascular:      Rate and Rhythm: Normal rate and regular rhythm.      Pulses:           Dorsalis pedis pulses are 2+ on the right side and 2+ on the left side.        Posterior tibial pulses are 1+ on the right side and 1+ on the left side.   Feet:      Right foot:      Skin integrity: Dry skin present.      Left foot:      Skin integrity: Dry skin present.   Skin:     Capillary Refill: Capillary refill takes less than 2 seconds.      Comments: All nails are dystrophic.  They demonstrate distal mycosis.  Hallux demonstrates paronychia of the fibular nail groove.  Negative pus.  Patient is xerosis of skin.   Neurological:      Mental Status: She is alert.   Psychiatric:         Mood and Affect: Mood normal.         Behavior: Behavior normal.         Thought  Content: Thought content normal.         Judgment: Judgment normal.

## 2025-03-06 DIAGNOSIS — E78.1 HYPERTRIGLYCERIDEMIA: ICD-10-CM

## 2025-03-07 RX ORDER — FENOFIBRATE 160 MG/1
160 TABLET ORAL DAILY
Qty: 90 TABLET | Refills: 1 | Status: SHIPPED | OUTPATIENT
Start: 2025-03-07

## 2025-03-19 DIAGNOSIS — I48.0 PAROXYSMAL ATRIAL FIBRILLATION (HCC): Primary | ICD-10-CM

## 2025-03-19 RX ORDER — METOPROLOL SUCCINATE 25 MG/1
25 TABLET, EXTENDED RELEASE ORAL EVERY OTHER DAY
Qty: 45 TABLET | Refills: 1 | Status: SHIPPED | OUTPATIENT
Start: 2025-03-19

## 2025-03-23 DIAGNOSIS — I27.20 PULMONARY HYPERTENSION (HCC): ICD-10-CM

## 2025-03-24 ENCOUNTER — TELEPHONE (OUTPATIENT)
Dept: FAMILY MEDICINE CLINIC | Facility: CLINIC | Age: 89
End: 2025-03-24

## 2025-03-24 RX ORDER — FUROSEMIDE 20 MG/1
20 TABLET ORAL EVERY MORNING
Qty: 90 TABLET | Refills: 1 | Status: SHIPPED | OUTPATIENT
Start: 2025-03-24

## 2025-03-24 NOTE — TELEPHONE ENCOUNTER
Patient called to request a refill for their Furosemide 20 mg advised a refill was requested on 03/23/25 and is pending approval. Patient verbalized understanding and is in agreement.     Does the patient have enough for 10 (Mail Order) days?   [] Yes   [x] No - Send as HP to POD

## 2025-03-26 ENCOUNTER — NURSE TRIAGE (OUTPATIENT)
Age: 89
End: 2025-03-26

## 2025-03-26 NOTE — TELEPHONE ENCOUNTER
"FOLLOW UP: patient will call if symptoms worsen     REASON FOR CONVERSATION: COVID-19    SYMPTOMS: very mild -nasal congestion , loss of taste and smell which she says she has anyway, but noticed it is more prevalent this week    OTHER: declines anti-viral medication. Will continue to monitor her symptoms at home , symptoms based treatment     DISPOSITION: Home Care  Reason for Disposition   COVID-19 diagnosed by positive lab test (e.g., PCR, rapid self-test kit) and mild symptoms (e.g., cough, fever, others) and no complications or SOB    Answer Assessment - Initial Assessment Questions  1. SYMPTOMS: \"What is your main symptom or concern?\" (e.g., cough, fever, shortness of breath, muscle aches)      Nasal congestion   2. ONSET: \"When did the symptoms start?\"       At least a month   3. COUGH: \"Do you have a cough?\" If Yes, ask: \"How bad is the cough?\"        No cough  4. FEVER: \"Do you have a fever?\" If Yes, ask: \"What is your temperature, how was it measured, and when did it start?\"      Denies   5. BREATHING DIFFICULTY: \"Are you having any difficulty breathing?\" (e.g., normal; shortness of breath, wheezing, unable to speak)       Denies   6. BETTER-SAME-WORSE: \"Are you getting better, staying the same or getting worse compared to yesterday?\"  If getting worse, ask, \"In what way?\"      same  7. OTHER SYMPTOMS: \"Do you have any other symptoms?\"  (e.g., chills, fatigue, headache, loss of smell or taste, muscle pain, sore throat)      Loss of taste and smell , worse lately    8. COVID-19 DIAGNOSIS: \"How do you know that you have COVID?\" (e.g., positive lab test or self-test, diagnosed by doctor or NP/PA, symptoms after exposure).      Home test - positive  9. COVID-19 EXPOSURE: \"Was there any known exposure to COVID before the symptoms began?\"       friend  10. COVID-19 VACCINE: \"Have you had the COVID-19 vaccine?\" If Yes, ask: \"When did you last get it?\"        yes  11. HIGH RISK DISEASE: \"Do you have any chronic " "medical problems?\" (e.g., asthma, heart or lung disease, weak immune system, obesity, etc.)        Age . A-Fib , Pulmonary Hypertension     13. O2 SATURATION MONITOR:  \"Do you use an oxygen saturation monitor (pulse oximeter) at home?\" If Yes, ask \"What is your reading (oxygen level) today?\" \"What is your usual oxygen saturation reading?\" (e.g., 95%)        Does not have one    Protocols used: COVID-19 - Diagnosed or Suspected-Adult-[place]    "

## 2025-04-08 DIAGNOSIS — R43.0 ANOSMIA: Primary | ICD-10-CM

## 2025-04-29 ENCOUNTER — OFFICE VISIT (OUTPATIENT)
Dept: CARDIOLOGY CLINIC | Facility: CLINIC | Age: 89
End: 2025-04-29
Payer: MEDICARE

## 2025-04-29 VITALS
BODY MASS INDEX: 25.42 KG/M2 | WEIGHT: 113 LBS | OXYGEN SATURATION: 97 % | DIASTOLIC BLOOD PRESSURE: 70 MMHG | SYSTOLIC BLOOD PRESSURE: 118 MMHG | HEIGHT: 56 IN | HEART RATE: 83 BPM

## 2025-04-29 DIAGNOSIS — J44.9 CHRONIC OBSTRUCTIVE PULMONARY DISEASE, UNSPECIFIED COPD TYPE (HCC): ICD-10-CM

## 2025-04-29 DIAGNOSIS — R01.1 CARDIAC MURMUR: ICD-10-CM

## 2025-04-29 DIAGNOSIS — N28.89 LEFT KIDNEY MASS: ICD-10-CM

## 2025-04-29 DIAGNOSIS — E78.5 DYSLIPIDEMIA: ICD-10-CM

## 2025-04-29 DIAGNOSIS — I11.9 HYPERTENSIVE HEART DISEASE WITHOUT HEART FAILURE: ICD-10-CM

## 2025-04-29 DIAGNOSIS — I48.0 PAROXYSMAL ATRIAL FIBRILLATION (HCC): ICD-10-CM

## 2025-04-29 DIAGNOSIS — I27.20 PULMONARY HYPERTENSION (HCC): ICD-10-CM

## 2025-04-29 DIAGNOSIS — R06.09 DOE (DYSPNEA ON EXERTION): ICD-10-CM

## 2025-04-29 PROCEDURE — 93000 ELECTROCARDIOGRAM COMPLETE: CPT | Performed by: INTERNAL MEDICINE

## 2025-04-29 PROCEDURE — 99214 OFFICE O/P EST MOD 30 MIN: CPT | Performed by: INTERNAL MEDICINE

## 2025-04-29 NOTE — PROGRESS NOTES
Progress note- Cardiology Office  [facility].    Alea Lovelace 89 y.o. female MRN: [id number]  : 1935  Unit/Bed#:  Encounter: [id number]      Assessment:     1. RIVERS (dyspnea on exertion)    2. Hypertensive heart disease without heart failure    3. Paroxysmal atrial fibrillation (HCC)    4. Cardiac murmur    5. Pulmonary hypertension (HCC)    6. Dyslipidemia    7. Chronic obstructive pulmonary disease, unspecified COPD type (HCC)    8. Left kidney mass        Discussion summary and Plan:       1. Paroxysmal atrial fibrillation.  She is maintaining sinus rhythm.  As per previous cardiac there is some component of tachy-jonna syndrome.  She is on low-dose metoprololToprol-XL 25 mg every other day.  Heart rate is 83 bpm will continue same medications.     2. Hypertensive heart disease without heart failure.  She has takes diuretics blood pressure has been acceptable.  Continue metoprolol XL every other day and Lasix.  She gets once a year blood test she is scheduled to have that done.     3. Dyslipidemia.  She has been on fenofibrate.  She is not tolerating very well.  Cardiology note reviewed.   She could not tolerate other statins.  Same medications      4. Cardiac murmur.  She does have history of cardiac murmur echo Doppler in May 2021 shows mild-to-moderate MR mild TR EF is acceptable.  Her echo Doppler from 2024 reviewed.  Which shows patient has normal LV systolic function mild valvular disease with mild pulmonary hypertension      5. History of COPD and bronchiectasis.  Currently stable patient never smoked her  used to smoke and she worked in a restaurant.  She is not having any cough at this time.     6. Mild pulmonary hypertension and she has no change in symptoms PA pressure is 54     7. DJD walks with the help of cane currently getting injections in her feet doing well.    8.  Abnormal CT of the abdominal with kidney mass.  Patient is aware she does not want  any further workup.  Advised her to have repeat CAT scan aches if growing fastly maybe she should consider it removal.  She will have follow-up appointment with her primary care team.    Continue current Rx.  We will continue to follow.      Patient  was advised and educated to call our office  immediately if  patient has any new symptoms of chest pain/shortness of breath, near-syncope, syncope, light headedness sustained palpitations  or any other cardiovascular symptoms before their scheduled follow-up appointment.  Office #[phone].    Thank you for your consultation.  If you have any question please call me at [phone]    Counseling :  A description of the counseling.  Goals and Barriers.  Patient's ability to self care: Yes  Medication side effect reviewed with patient in detail and all their questions answered to their satisfaction.      Primary Care Physician  MARK Iyer      HPI :     Alea Lovelace is a 89 y.o. year old female who was referred by primary care doctor for  Atrial fibrillation and to establish with practice.  Patient who used to live in [place] moved here as her  has passed away.  She used to follow up with cardiologist who has note from February 22nd I reviewed.  She has medical history significant as per note intermittent /paroxysmal atrial fibrillation, elevated Kris Vasc score, mild pulmonary hypertension, bronchiectasis, DJD and dyslipidemia.  She has an echo Doppler done in May 2021 which shows her EF is 55% mild left atrial enlargement with size of 4.4 cm mild-to-moderate MR and mild TR with PA pressure 35 mmHg.  She has been doing well her med list was reviewed she is taking Eliquis 2.5 twice a day Lasix 20 mg daily fenofibrate and Xyzal.      She had history of hysterectomy as well as shoulder surgery otherwise she is doing well.  History of cholecystectomy    12/16/2022.    Patient who has a medical history significant for paroxysmal atrial  fibrillation, high [id number] score, mild pulm hypertension, history of bronchiectasis, DJD who came for follow-up.  Her previous EF is around 55%.  She moved here as her son works here.  She walks with the help of a cane but she is pretty independent in her daily activity.  Heart positive closed history of hysterectomy.  She is otherwise doing well.  No nausea no vomiting no fever no chills no PND no orthopnea no other cardiovascular issues.    7/12/2023.    Above reviewed.  Patient who has medical history significant for paroxysmal atrial fibrillation, CHADS2- VASc score, mild pulmonary hypertension, history of bronchiectasis, DJD, dyslipidemia who came for follow-up.  She walks with the help of cane.  She is pretty independent with daily activities.  Her other medical history is positive for history of hysterectomy.  Currently she is having 6 injections in her foot from a podiatrist which has helped her.  EKG shows sinus rhythm heart rate 88 bpm left posterior fascicular block and QS in V1 to V3 due to lead location.  She is pretty compliant with her medications.  No other issues at this time.  Her blood test done with her primary care doctor which shows slightly elevated calcium other labs are acceptable.    3/14/2024.    Was reviewed.  Patient came for follow-up.  Medical history significant for paroxysmal atrial fibrillation, LAWRENCE DS 2 vas score high, mild pulmonary hypertension, history of bronchiectasis, DJD, dyslipidemia came for follow-up.  She was recently diagnosed to have a kidney mass 1.9 cm concerning for renal cell cancer but she does not want to do any cardiac workup at this time.  She has been issue with her foot pain and she walks with the help of cane EKG shows sinus rhythm heart rate 84 bpm and Q waves noted inferior leads most like due to pseudo infarct pattern.  Previous EKG shows she has a normal LV systolic function.  She has blood test done 2/16/2024 which has been acceptable and her  vitals are stable today.    4/29/2025.    Above reviewed.  89-year-old woman with a history of PAF, LAWRENCE DS 2 VASc score high, hypertension, pulmonary hypertension, history of bronchiectasis, history of kidney mass concerning renal cell cancer but does not want any workup came for follow-up.  She was evaluated by our nurse practitioner in November 2024.  Her blood test from February 2024 reviewed.  She has lost some weight.  Her current medications reviewed she is on Eliquis 2.5 twice a day, fenofibrate 160, Lasix 20 mg and metoprolol XL 25 mg every other day.  Her cardiac workup in the form of echo from March 2024 reviewed.  Overall she feels well.  EKG shows sinus rhythm with heart rate 83 bpm she has lost some weight.  Recently she had a covert she is recovered from it but she did lose some test.      Review of Systems   Constitutional:  Negative for activity change, chills, diaphoresis, fever and unexpected weight change.   HENT:  Positive for dental problem. Negative for congestion.    Eyes:  Negative for discharge and redness.   Respiratory:  Negative for cough, chest tightness, shortness of breath and wheezing.    Cardiovascular: Negative.  Negative for chest pain, palpitations and leg swelling.   Gastrointestinal:  Negative for abdominal pain, diarrhea and nausea.   Endocrine: Negative.    Genitourinary:  Negative for decreased urine volume and urgency.   Musculoskeletal:  Positive for arthralgias and gait problem. Negative for back pain.   Skin:  Negative for rash and wound.   Allergic/Immunologic: Negative.    Neurological:  Negative for dizziness, seizures, syncope, weakness, light-headedness and headaches.   Hematological: Negative.    Psychiatric/Behavioral:  Negative for agitation and confusion. The patient is not nervous/anxious.        Historical Information   Past Medical History:   Diagnosis Date   • A-fib (HCC)     from prior records   • Arthritis 2018   • Cerebral aneurysm, nonruptured 01/23/2024  "  • COPD (chronic obstructive pulmonary disease) (HCC)    • Heart disease 2016   • Hyperlipidemia    • MR (mitral regurgitation)    • Osteoarthritis 2020   • Pulmonary hypertension (HCC)    • Stroke (HCC)     Sister   • Urinary tract infection      Past Surgical History:   Procedure Laterality Date   • CHOLECYSTECTOMY     • HYSTERECTOMY     • JOINT REPLACEMENT  2018   • SHOULDER SURGERY  2016   • TUBAL LIGATION       Social History     Substance and Sexual Activity   Alcohol Use Never     Social History     Substance and Sexual Activity   Drug Use Never     Social History     Tobacco Use   Smoking Status Never   • Passive exposure: Past   Smokeless Tobacco Never   Tobacco Comments    Father, , mother-in-law all smoked     Family History:   Family History   Problem Relation Age of Onset   • Stroke Sister    • Seizures Sister         Passed away   • Migraines Sister         Stopped       Meds/Allergies     Allergies   Allergen Reactions   • Nitrofurantoin Other (See Comments)   • Tramadol Other (See Comments)       Current Outpatient Medications:   •  apixaban (Eliquis) 2.5 mg, TAKE 1 TABLET TWICE A DAY, Disp: 180 tablet, Rfl: 1  •  fenofibrate 160 MG tablet, Take 1 tablet (160 mg total) by mouth daily, Disp: 90 tablet, Rfl: 1  •  furosemide (LASIX) 20 mg tablet, TAKE 1 TABLET EVERY MORNING, Disp: 90 tablet, Rfl: 1  •  GNP CRANBERRY EXTRACT PO, Take 600 mg by mouth, Disp: , Rfl:   •  Magnesium Glycinate 120 MG CAPS, , Disp: , Rfl:   •  metoprolol succinate (TOPROL-XL) 25 mg 24 hr tablet, Take 1 tablet (25 mg total) by mouth every other day, Disp: 45 tablet, Rfl: 1  •  Multiple Vitamin (MULTIVITAMIN ADULT PO), Take 1 tablet by mouth daily, Disp: , Rfl:   •  timolol (TIMOPTIC) 0.5 % ophthalmic solution, , Disp: , Rfl:     Vitals: Blood pressure 118/70, pulse 83, height 4' 8\" (1.422 m), weight 51.3 kg (113 lb), SpO2 97%.    Body mass index is 25.33 kg/m².  Wt Readings from Last 3 Encounters:   04/29/25 51.3 kg " (113 lb)   02/25/25 51.3 kg (113 lb)   02/13/25 51.4 kg (113 lb 6.4 oz)     Vitals:    04/29/25 1317   Weight: 51.3 kg (113 lb)       BP Readings from Last 3 Encounters:   04/29/25 118/70   02/13/25 104/70   12/02/24 104/76         Physical Exam  Constitutional:       General: She is not in acute distress.     Appearance: She is well-developed. She is not diaphoretic.   Neck:      Thyroid: No thyromegaly.      Vascular: No JVD.      Trachea: No tracheal deviation.   Cardiovascular:      Rate and Rhythm: Normal rate and regular rhythm.      Heart sounds: S1 normal and S2 normal. Heart sounds not distant. Murmur heard.      Systolic (ejection) murmur is present with a grade of 2/6.      No friction rub. No gallop. No S3 or S4 sounds.   Pulmonary:      Effort: Pulmonary effort is normal. No respiratory distress.      Breath sounds: No wheezing or rales.      Comments: Bilateral breath sounds with chronic coarse breath sounds known to have history of bronchiectasis.  Chest:      Chest wall: No tenderness.   Abdominal:      General: Bowel sounds are normal. There is no distension.      Palpations: Abdomen is soft.      Tenderness: There is no abdominal tenderness.   Musculoskeletal:         General: No deformity.      Cervical back: Neck supple.   Skin:     General: Skin is warm and dry.      Coloration: Skin is not pale.      Findings: No rash.   Neurological:      Mental Status: She is alert and oriented to person, place, and time.   Psychiatric:         Behavior: Behavior normal.         Judgment: Judgment normal.             Diagnostic Studies Review Cardio:      Echo Doppler done in May 2021 in  [place] shows EF 55%, mild-to-moderate MR, mild TR with PA pressure 34 left atrial size was 4.4 cm.    Repeat echo Doppler in March 2024 shows EF 60%, mild MR mild TR with PA pressure around 54 mmHg.      EKG:  Twelve lead EKG 06/10/2022 shows normal sinus rhythm heart rate 65 beats per minute no other  "abnormality.    Twelve-lead EKG done on 12/16/2022 showed normal sinus and heart rate 75 bpm.  No change from other EKG    Twelve-lead EKG done on 7/12/2023 shows normal sinus them heart rate 88 bpm.  Left history of fascicular block.  Not much change from previous EKG    Twelve-lead EKG done 3/14/2024 shows sinus rhythm.  Inferior leads most like pseudo infarct pattern heart rate is 84 bpm not much change from previous EKG.    Twelve-lead EKG 4/29/2025 sinus rhythm heart rate 83 bpm.  Not much change from previous EKG.        Dr. Tae Sim MD [facility]      \"This note has been constructed using a voice recognition system.Therefore there may be syntax, spelling, and/or grammatical errors. Please call if you have any questions. \"  "

## 2025-05-12 ENCOUNTER — OFFICE VISIT (OUTPATIENT)
Dept: URGENT CARE | Facility: CLINIC | Age: 89
End: 2025-05-12
Payer: MEDICARE

## 2025-05-12 VITALS
OXYGEN SATURATION: 99 % | HEIGHT: 59 IN | TEMPERATURE: 97.8 F | HEART RATE: 72 BPM | RESPIRATION RATE: 18 BRPM | WEIGHT: 112.25 LBS | BODY MASS INDEX: 22.63 KG/M2 | DIASTOLIC BLOOD PRESSURE: 81 MMHG | SYSTOLIC BLOOD PRESSURE: 117 MMHG

## 2025-05-12 DIAGNOSIS — R82.90 URINE ABNORMALITY: ICD-10-CM

## 2025-05-12 DIAGNOSIS — N39.0 URINARY TRACT INFECTION WITHOUT HEMATURIA, SITE UNSPECIFIED: Primary | ICD-10-CM

## 2025-05-12 LAB
SL AMB  POCT GLUCOSE, UA: ABNORMAL
SL AMB LEUKOCYTE ESTERASE,UA: ABNORMAL
SL AMB POCT BILIRUBIN,UA: ABNORMAL
SL AMB POCT BLOOD,UA: ABNORMAL
SL AMB POCT CLARITY,UA: ABNORMAL
SL AMB POCT COLOR,UA: YELLOW
SL AMB POCT KETONES,UA: ABNORMAL
SL AMB POCT NITRITE,UA: ABNORMAL
SL AMB POCT PH,UA: 6.5
SL AMB POCT SPECIFIC GRAVITY,UA: 1.02
SL AMB POCT URINE PROTEIN: ABNORMAL
SL AMB POCT UROBILINOGEN: 0.2

## 2025-05-12 PROCEDURE — 87077 CULTURE AEROBIC IDENTIFY: CPT | Performed by: PHYSICIAN ASSISTANT

## 2025-05-12 PROCEDURE — 87086 URINE CULTURE/COLONY COUNT: CPT | Performed by: PHYSICIAN ASSISTANT

## 2025-05-12 PROCEDURE — 87186 SC STD MICRODIL/AGAR DIL: CPT | Performed by: PHYSICIAN ASSISTANT

## 2025-05-12 PROCEDURE — 99213 OFFICE O/P EST LOW 20 MIN: CPT | Performed by: PHYSICIAN ASSISTANT

## 2025-05-12 PROCEDURE — 81002 URINALYSIS NONAUTO W/O SCOPE: CPT | Performed by: PHYSICIAN ASSISTANT

## 2025-05-12 RX ORDER — SULFAMETHOXAZOLE AND TRIMETHOPRIM 800; 160 MG/1; MG/1
1 TABLET ORAL EVERY 12 HOURS SCHEDULED
Qty: 10 TABLET | Refills: 0 | Status: SHIPPED | OUTPATIENT
Start: 2025-05-12 | End: 2025-05-17

## 2025-05-14 LAB
BACTERIA UR CULT: ABNORMAL
BACTERIA UR CULT: ABNORMAL

## 2025-05-28 ENCOUNTER — OFFICE VISIT (OUTPATIENT)
Age: 89
End: 2025-05-28
Payer: MEDICARE

## 2025-05-28 VITALS — HEIGHT: 59 IN | RESPIRATION RATE: 18 BRPM | WEIGHT: 112 LBS | BODY MASS INDEX: 22.58 KG/M2

## 2025-05-28 DIAGNOSIS — M79.672 PAIN IN BOTH FEET: ICD-10-CM

## 2025-05-28 DIAGNOSIS — I70.209 PERIPHERAL ARTERIOSCLEROSIS (HCC): Primary | ICD-10-CM

## 2025-05-28 DIAGNOSIS — M79.671 PAIN IN BOTH FEET: ICD-10-CM

## 2025-05-28 DIAGNOSIS — B35.1 ONYCHOMYCOSIS: ICD-10-CM

## 2025-05-28 PROCEDURE — 11721 DEBRIDE NAIL 6 OR MORE: CPT | Performed by: PODIATRIST

## 2025-05-28 PROCEDURE — RECHECK: Performed by: PODIATRIST

## 2025-05-28 NOTE — PROGRESS NOTES
Assessment/Plan: Pain upon ambulation secondary to mycosis of nail and peripheral artery disease.  Paronychia right hallux.     Plan.  Chart reviewed.  PCP notes reviewed.  Patient evaluated.  Patient advised on foot hygiene.  Patient given shoe recommendations.  No cutting instruction offered.  Watch for signs of infection.  Aftercare instruction given.  Spray shoes with Lysol.  Return for follow-up as needed.           Assessment  Diagnoses and all orders for this visit:     Pain in both feet     Onychomycosis     Peripheral arteriosclerosis (HCC)     Paronychia of toenail of right foot              Subjective: Patient gets pain.  She has pain in her toes ambulation and shoewear.  No history of trauma.  Patient has severe osteoarthritis.          Allergies   Allergen Reactions    Nitrofurantoin Other (See Comments)    Tramadol Other (See Comments)           Current Medications      Current Outpatient Medications:     apixaban (Eliquis) 2.5 mg, TAKE 1 TABLET TWICE A DAY, Disp: 180 tablet, Rfl: 1    atenolol (TENORMIN) 25 mg tablet, Take 0.5 tablets (12.5 mg total) by mouth daily, Disp: 45 tablet, Rfl: 1    fenofibrate 160 MG tablet, Take 1 tablet (160 mg total) by mouth daily, Disp: 90 tablet, Rfl: 3    furosemide (LASIX) 20 mg tablet, Take 1 tablet (20 mg total) by mouth in the morning, Disp: 90 tablet, Rfl: 1    GNP CRANBERRY EXTRACT PO, Take 600 mg by mouth, Disp: , Rfl:     Magnesium Glycinate 120 MG CAPS, , Disp: , Rfl:     Multiple Vitamin (MULTIVITAMIN ADULT PO), Take 1 tablet by mouth daily, Disp: , Rfl:     timolol (TIMOPTIC) 0.5 % ophthalmic solution, , Disp: , Rfl:         Problem List       Patient Active Problem List   Diagnosis    Paroxysmal atrial fibrillation (HCC)    Dyslipidemia    Chronic obstructive pulmonary disease (HCC)    Pulmonary hypertension (HCC)    Cardiac murmur    Cerebral aneurysm, nonruptured    Glaucoma    Osteoarthritis    Hypertriglyceridemia               Subjective  Patient ID:  Alea Lovelace is a 89 y.o. female.     HPI     The following portions of the patient's history were reviewed and updated as appropriate:      family history includes Stroke in her sister.       reports that she has never smoked. She has been exposed to tobacco smoke. She has never used smokeless tobacco. She reports that she does not currently use alcohol. She reports that she does not use drugs.      Objective:  Patient's shoes and socks removed.  Objective  Foot Exam     General  General Appearance: appears stated age and healthy   Orientation: alert and oriented to person, place, and time   Affect: appropriate   Gait: antalgic   Assistance: walker use         Right Foot/Ankle      Inspection and Palpation  Tenderness: metatarsals   Swelling: dorsum   Arch: pes cavus  Hammertoes: fifth toe  Skin Exam: dry skin;      Neurovascular  Dorsalis pedis: 2+  Posterior tibial: 1+        Left Foot/Ankle       Inspection and Palpation  Tenderness: metatarsals   Swelling: dorsum   Arch: pes cavus  Hammertoes: fifth toe  Skin Exam: dry skin;      Neurovascular  Dorsalis pedis: 2+  Posterior tibial: 1+        Physical Exam  Vitals and nursing note reviewed.   Constitutional:       Appearance: Normal appearance.   Cardiovascular:      Rate and Rhythm: Normal rate and regular rhythm.      Pulses:           Dorsalis pedis pulses are 2+ on the right side and 2+ on the left side.        Posterior tibial pulses are 1+ on the right side and 1+ on the left side.   Feet:      Right foot:      Skin integrity: Dry skin present.      Left foot:      Skin integrity: Dry skin present.   Skin:     Capillary Refill: Capillary refill takes less than 2 seconds.      Comments: All nails are dystrophic.  They demonstrate distal mycosis.  Hallux demonstrates paronychia of the fibular nail groove.  Negative pus.  Patient is xerosis of skin.   Neurological:      Mental Status: She is alert.   Psychiatric:         Mood and Affect: Mood normal.          Behavior: Behavior normal.         Thought Content: Thought content normal.         Judgment: Judgment normal.

## 2025-06-27 NOTE — PROGRESS NOTES
[facility]        NAME: Alea Lovelace is a 89 y.o. female  : 1935    MRN: [id number]  DATE: May 12, 2025  TIME: 10:58 PM    Assessment and Plan   Urinary tract infection without hematuria, site unspecified [N39.0]  1. Urinary tract infection without hematuria, site unspecified  sulfamethoxazole-trimethoprim (BACTRIM DS) 800-160 mg per tablet    Urine culture      2. Urine abnormality  POCT urine dip    Urine culture            Patient Instructions   Urinary tract infection  UA dip- leuks and nitrites, urine culture and sensitivity sent out and will call if we need to change antibiotic  rx macrobid twice daily x 5 days  Increase fluid intake  Tylenol/ibuprofen as needed for pain  azostat as needed for pain    Follow up with PCP in 3-5 days.  Proceed to  ER if symptoms worsen.    If tests have been performed at [facility], our office will contact you with results if changes need to be made to the care plan discussed with you at the visit.  You can review your full results on [facility] MyChart.    Chief Complaint     Chief Complaint   Patient presents with    Cystitis     Patient is concerned with the color of her urine         History of Present Illness       Fariba is an 89-year-old female who presents to the clinic complaining of change in the color of her urine.  She states she has a history of bladder issues and sees a urologist.  She states that her UTIs frequently began like this.  She denies any fever, chills, increased frequency of urination, urinary urgency, or dysuria.  She denies any abdominal pain, back pain, or flank pain.        Review of Systems   Review of Systems   Constitutional:  Negative for chills and fever.   Gastrointestinal:  Negative for abdominal pain.   Genitourinary:  Negative for dysuria, flank pain, frequency, hematuria, urgency, vaginal bleeding, vaginal discharge and vaginal pain.   Musculoskeletal:  Negative for back pain.         Current Medications     Current  "Medications[1]    Current Allergies     Allergies as of 05/12/2025 - Reviewed 05/12/2025   Allergen Reaction Noted    Nitrofurantoin Other (See Comments) 06/24/2019    Tramadol Other (See Comments) 05/17/2022            The following portions of the patient's history were reviewed and updated as appropriate: allergies, current medications, past family history, past medical history, past social history, past surgical history and problem list.     Past Medical History[2]    Past Surgical History[3]    Family History[4]      Medications have been verified.        Objective   /81 (BP Location: Left arm, Patient Position: Sitting, Cuff Size: Standard)   Pulse 72   Temp 97.8 °F (36.6 °C) (Temporal)   Resp 18   Ht 4' 10.66\" (1.49 m)   Wt 50.9 kg (112 lb 4 oz)   SpO2 99%   BMI 22.93 kg/m²   No LMP recorded. Patient is postmenopausal.       Physical Exam     Physical Exam  Vitals and nursing note reviewed.   Constitutional:       General: She is not in acute distress.     Appearance: Normal appearance. She is not ill-appearing.   Pulmonary:      Effort: Pulmonary effort is normal.   Abdominal:      General: There is no distension.      Palpations: Abdomen is soft.      Tenderness: There is no abdominal tenderness. There is no right CVA tenderness, left CVA tenderness, guarding or rebound.     Neurological:      Mental Status: She is alert and oriented to person, place, and time.     Psychiatric:         Mood and Affect: Mood normal.         Behavior: Behavior normal.                        [1]   Current Outpatient Medications:     apixaban (Eliquis) 2.5 mg, TAKE 1 TABLET TWICE A DAY, Disp: 180 tablet, Rfl: 1    fenofibrate 160 MG tablet, Take 1 tablet (160 mg total) by mouth daily, Disp: 90 tablet, Rfl: 1    furosemide (LASIX) 20 mg tablet, TAKE 1 TABLET EVERY MORNING, Disp: 90 tablet, Rfl: 1    GNP CRANBERRY EXTRACT PO, Take 600 mg by mouth, Disp: , Rfl:     Magnesium Glycinate 120 MG CAPS, , Disp: , Rfl:     " metoprolol succinate (TOPROL-XL) 25 mg 24 hr tablet, Take 1 tablet (25 mg total) by mouth every other day, Disp: 45 tablet, Rfl: 1    Multiple Vitamin (MULTIVITAMIN ADULT PO), Take 1 tablet by mouth in the morning., Disp: , Rfl:     timolol (TIMOPTIC) 0.5 % ophthalmic solution, , Disp: , Rfl:   [2]   Past Medical History:  Diagnosis Date    A-fib (HCC)     from prior records    Arthritis 2018    Cerebral aneurysm, nonruptured 01/23/2024    COPD (chronic obstructive pulmonary disease) (HCC)     Heart disease 2016    Hyperlipidemia     MR (mitral regurgitation)     Osteoarthritis 2020    Pulmonary hypertension (HCC)     Stroke (HCC)     Sister    Urinary tract infection    [3]   Past Surgical History:  Procedure Laterality Date    CHOLECYSTECTOMY      HYSTERECTOMY      JOINT REPLACEMENT  2018    SHOULDER SURGERY  2016    TUBAL LIGATION     [4]   Family History  Problem Relation Name Age of Onset    Stroke Sister Gabby Carter     Seizures Sister Gabby Carter         Passed away    Migraines Sister Gini ana         Stopped

## 2025-07-14 DIAGNOSIS — I48.0 PAROXYSMAL ATRIAL FIBRILLATION (HCC): ICD-10-CM

## 2025-07-14 NOTE — TELEPHONE ENCOUNTER
Reason for call:   [x] Refill   [] Prior Auth  [] Other:     Office:   [] PCP/Provider -   [x] Specialty/Provider -     Medication: apixaban (Eliquis) 2.5 mg     Dose/Frequency:  TAKE 1 TABLET TWICE A DAY     Quantity: 180 tablet     Pharmacy: [facility] - [place] - [address] [facility] [address]     Mail Away Pharmacy   Does the patient have enough for 10 days?   [x] Yes   [] No - Send as HP to POD

## 2025-07-15 ENCOUNTER — APPOINTMENT (OUTPATIENT)
Dept: LAB | Facility: CLINIC | Age: 89
End: 2025-07-15
Attending: NURSE PRACTITIONER
Payer: MEDICARE

## 2025-07-15 DIAGNOSIS — I27.20 PULMONARY HYPERTENSION (HCC): ICD-10-CM

## 2025-07-15 DIAGNOSIS — J44.9 CHRONIC OBSTRUCTIVE PULMONARY DISEASE, UNSPECIFIED COPD TYPE (HCC): ICD-10-CM

## 2025-07-15 DIAGNOSIS — E78.1 HYPERTRIGLYCERIDEMIA: ICD-10-CM

## 2025-07-15 DIAGNOSIS — E78.5 DYSLIPIDEMIA: ICD-10-CM

## 2025-07-15 DIAGNOSIS — I48.0 PAROXYSMAL ATRIAL FIBRILLATION (HCC): ICD-10-CM

## 2025-07-15 LAB
ALBUMIN SERPL BCG-MCNC: 3.4 G/DL (ref 3.5–5)
ALP SERPL-CCNC: 25 U/L (ref 34–104)
ALT SERPL W P-5'-P-CCNC: 12 U/L (ref 7–52)
ANION GAP SERPL CALCULATED.3IONS-SCNC: 4 MMOL/L (ref 4–13)
AST SERPL W P-5'-P-CCNC: 26 U/L (ref 13–39)
BASOPHILS # BLD AUTO: 0.08 THOUSANDS/ÂΜL (ref 0–0.1)
BASOPHILS NFR BLD AUTO: 1 % (ref 0–1)
BILIRUB SERPL-MCNC: 0.44 MG/DL (ref 0.2–1)
BUN SERPL-MCNC: 18 MG/DL (ref 5–25)
CALCIUM ALBUM COR SERPL-MCNC: 9.7 MG/DL (ref 8.3–10.1)
CALCIUM SERPL-MCNC: 9.2 MG/DL (ref 8.4–10.2)
CHLORIDE SERPL-SCNC: 102 MMOL/L (ref 96–108)
CHOLEST SERPL-MCNC: 103 MG/DL (ref ?–200)
CO2 SERPL-SCNC: 35 MMOL/L (ref 21–32)
CREAT SERPL-MCNC: 0.87 MG/DL (ref 0.6–1.3)
EOSINOPHIL # BLD AUTO: 0.24 THOUSAND/ÂΜL (ref 0–0.61)
EOSINOPHIL NFR BLD AUTO: 4 % (ref 0–6)
ERYTHROCYTE [DISTWIDTH] IN BLOOD BY AUTOMATED COUNT: 15 % (ref 11.6–15.1)
GFR SERPL CREATININE-BSD FRML MDRD: 60 ML/MIN/1.73SQ M
GLUCOSE P FAST SERPL-MCNC: 88 MG/DL (ref 65–99)
HCT VFR BLD AUTO: 37.1 % (ref 34.8–46.1)
HDLC SERPL-MCNC: 37 MG/DL
HGB BLD-MCNC: 11.7 G/DL (ref 11.5–15.4)
IMM GRANULOCYTES # BLD AUTO: 0.01 THOUSAND/UL (ref 0–0.2)
IMM GRANULOCYTES NFR BLD AUTO: 0 % (ref 0–2)
LDLC SERPL CALC-MCNC: 47 MG/DL (ref 0–100)
LYMPHOCYTES # BLD AUTO: 1.78 THOUSANDS/ÂΜL (ref 0.6–4.47)
LYMPHOCYTES NFR BLD AUTO: 30 % (ref 14–44)
MCH RBC QN AUTO: 30 PG (ref 26.8–34.3)
MCHC RBC AUTO-ENTMCNC: 31.5 G/DL (ref 31.4–37.4)
MCV RBC AUTO: 95 FL (ref 82–98)
MONOCYTES # BLD AUTO: 0.52 THOUSAND/ÂΜL (ref 0.17–1.22)
MONOCYTES NFR BLD AUTO: 9 % (ref 4–12)
NEUTROPHILS # BLD AUTO: 3.31 THOUSANDS/ÂΜL (ref 1.85–7.62)
NEUTS SEG NFR BLD AUTO: 56 % (ref 43–75)
NONHDLC SERPL-MCNC: 66 MG/DL
NRBC BLD AUTO-RTO: 0 /100 WBCS
PLATELET # BLD AUTO: 271 THOUSANDS/UL (ref 149–390)
PMV BLD AUTO: 11 FL (ref 8.9–12.7)
POTASSIUM SERPL-SCNC: 3.7 MMOL/L (ref 3.5–5.3)
PROT SERPL-MCNC: 6.2 G/DL (ref 6.4–8.4)
RBC # BLD AUTO: 3.9 MILLION/UL (ref 3.81–5.12)
SODIUM SERPL-SCNC: 141 MMOL/L (ref 135–147)
TRIGL SERPL-MCNC: 97 MG/DL (ref ?–150)
WBC # BLD AUTO: 5.94 THOUSAND/UL (ref 4.31–10.16)

## 2025-07-15 PROCEDURE — 80061 LIPID PANEL: CPT

## 2025-07-15 PROCEDURE — 85025 COMPLETE CBC W/AUTO DIFF WBC: CPT

## 2025-07-15 PROCEDURE — 80053 COMPREHEN METABOLIC PANEL: CPT

## 2025-07-15 PROCEDURE — 36415 COLL VENOUS BLD VENIPUNCTURE: CPT

## 2025-08-15 ENCOUNTER — OFFICE VISIT (OUTPATIENT)
Dept: FAMILY MEDICINE CLINIC | Facility: CLINIC | Age: 89
End: 2025-08-15
Payer: MEDICARE

## 2025-08-19 ENCOUNTER — TELEPHONE (OUTPATIENT)
Age: 89
End: 2025-08-19

## 2025-08-19 DIAGNOSIS — R09.81 NASAL CONGESTION: Primary | ICD-10-CM

## 2025-08-19 DIAGNOSIS — J30.2 SEASONAL ALLERGIES: ICD-10-CM

## 2025-08-19 RX ORDER — FLUTICASONE PROPIONATE 50 MCG
1 SPRAY, SUSPENSION (ML) NASAL 2 TIMES DAILY
Qty: 16 G | Refills: 2 | Status: SHIPPED | OUTPATIENT
Start: 2025-08-19